# Patient Record
Sex: FEMALE | Race: WHITE | NOT HISPANIC OR LATINO
[De-identification: names, ages, dates, MRNs, and addresses within clinical notes are randomized per-mention and may not be internally consistent; named-entity substitution may affect disease eponyms.]

---

## 2017-01-13 ENCOUNTER — APPOINTMENT (OUTPATIENT)
Dept: CARDIOLOGY | Facility: CLINIC | Age: 32
End: 2017-01-13

## 2017-01-17 ENCOUNTER — APPOINTMENT (OUTPATIENT)
Dept: CARDIOLOGY | Facility: CLINIC | Age: 32
End: 2017-01-17

## 2019-03-07 ENCOUNTER — EMERGENCY (EMERGENCY)
Facility: HOSPITAL | Age: 34
LOS: 1 days | Discharge: HOME | End: 2019-03-07
Attending: EMERGENCY MEDICINE
Payer: COMMERCIAL

## 2019-03-07 VITALS
SYSTOLIC BLOOD PRESSURE: 101 MMHG | DIASTOLIC BLOOD PRESSURE: 56 MMHG | RESPIRATION RATE: 18 BRPM | OXYGEN SATURATION: 100 % | HEIGHT: 68 IN | HEART RATE: 74 BPM | TEMPERATURE: 99 F | WEIGHT: 179.9 LBS

## 2019-03-07 PROCEDURE — 93010 ELECTROCARDIOGRAM REPORT: CPT

## 2019-03-07 PROCEDURE — 99285 EMERGENCY DEPT VISIT HI MDM: CPT | Mod: 25

## 2019-03-07 RX ORDER — ASPIRIN/CALCIUM CARB/MAGNESIUM 324 MG
325 TABLET ORAL ONCE
Qty: 0 | Refills: 0 | Status: COMPLETED | OUTPATIENT
Start: 2019-03-07 | End: 2019-03-07

## 2019-03-07 NOTE — ED PROVIDER NOTE - CHPI ED SYMPTOMS POS
CHEST TIGHTNESS/BACK PAIN/paresthesias, arm pain BACK PAIN/CHEST TIGHTNESS/NAUSEA/paresthesias, arm pain CHEST TIGHTNESS/BACK PAIN/SHORTNESS OF BREATH/paresthesias, arm pain/NAUSEA

## 2019-03-07 NOTE — ED PROVIDER NOTE - PHYSICAL EXAMINATION
Const: Awake, alert and oriented. In no acute distress. Well appearing.  HEENT: NC/AT. Moist mucous membranes.  Eyes: No scleral icterus. EOMI.  Neck:. Soft and supple. Full ROM without pain.  Cardiac: +S1/S2. No murmurs.   Resp: Speaking in full sentences. No evidence of respiratory distress. Anterior chest wall tenderness on palpation, No wheezes, rales or rhonchi.  Abd: Soft, non-tender, non-distended. Normal bowel sounds in all 4 quadrants. No guarding or rebound.  MSK: Left shoulder tenderness on palpation, FROM in all extremities, neurovascularly intact, radial pulse 2+, hand  5/5   Back: Spine midline and non-tender. No CVAT.  Skin: No rashes, abrasions or lacerations.  Lymph: No cervical lymphadenopathy.  Neuro: Awake, alert & oriented x 3. CN II-XII intact, finger to nose intact, neurovasculary intact, muscle strength fair, gait without ataxia, reflexes intact

## 2019-03-07 NOTE — ED PROVIDER NOTE - CLINICAL SUMMARY MEDICAL DECISION MAKING FREE TEXT BOX
32 y/o F with PMHx of pleurisy (diagnosed at [facility] 2 years ago, while in the hospital admitted for PNA) presents to ED c/o L arm pain, described as stabbing pain with associated paresthesias and nausea onset this morning. Notes she has had diffuse chest pain that radiates to the back, exacerbated by deep breaths and laying flat with shortness of breath for the past month , will obtain chest x-ray, EKG, lab work, pain control, reassess

## 2019-03-07 NOTE — ED PROVIDER NOTE - ATTENDING CONTRIBUTION TO CARE
32 yo F presents to ED c/o midsternal CP and SOB x 1 month.  Pain radiated to L arm yesterday with no assoc N/V, diaphoresis or syncope.  Pt with prior hx of UE s/p placement of IV.  On exam pt awake and alert in NAD, mm moist, Neck supple, Cor Reg, Lungs clear b/l, Abd soft, NT, BS+, L shoulder and ant chest wall with palp tenderness.  EKG sinus with no acuter changes.  will check labs, CE, d-dimer CXR and re-eval

## 2019-03-07 NOTE — ED PROVIDER NOTE - OBJECTIVE STATEMENT
32 y/o F with PMHx of pleurisy (diagnosed at [facility] 2 years ago, while in the hospital admitted for PNA) presents to ED c/o L arm pain, described as stabbing pain with associated paresthesias onset this morning. States she went to work today, but the pain progressively got worse, and tonight began to develop diffuse chest pain that radiates to the back, exacerbated by deep breaths and laying flat. Took 800 mg of Motrin this morning with no relief. Family hx of CAD, stents and MI (paternal side), but no personal PMHx of cardiac problems. Has had a blood clot in the RUE after an angiogram where they went in through her wrist. Notes her current symptoms 34 y/o F with PMHx of pleurisy (diagnosed at [facility] 2 years ago, while in the hospital admitted for PNA) presents to ED c/o L arm pain, described as stabbing pain with associated paresthesias onset this morning. States she went to work today, but the pain progressively got worse, and tonight began to develop diffuse chest pain that radiates to the back, exacerbated by deep breaths and laying flat. Took 800 mg of Motrin this morning with no relief. Family hx of CAD, stents and MI (paternal side), but no personal PMHx of cardiac problems. Has had a blood clot in the RUE after an angiogram where they went in through her wrist. Notes her current symptoms feel similar to when she was diagnosed with the pleurisy. Additionally notes she was experiencing leg cramping earlier in the week which as since subsided. Denies fevers or chills, shortness of breath, abdominal pain, nausea, vomiting, calf pain or LE swelling. No further acute complaints at this time. 32 y/o F with PMHx of pleurisy (diagnosed at [facility] 2 years ago, while in the hospital admitted for PNA) presents to ED c/o L arm pain, described as stabbing pain with associated paresthesias onset this morning. States she went to work today, but the pain progressively got worse, and tonight began to develop nausea and diffuse chest pain that radiates to the back, exacerbated by deep breaths and laying flat. Took 800 mg of Motrin this morning with no relief. Family hx of CAD, stents and MI (paternal side), but no personal PMHx of cardiac problems. Has had a blood clot in the RUE after an angiogram where they went in through her wrist. Notes her current symptoms feel similar to when she was diagnosed with the pleurisy. Additionally notes she was experiencing leg cramping earlier in the week which as since subsided. Denies fevers or chills, shortness of breath, abdominal pain, vomiting, calf pain or LE swelling. No further acute complaints at this time. 32 y/o F with PMHx of pleurisy (diagnosed at [facility] 2 years ago, while in the hospital admitted for PNA) presents to ED c/o L arm pain, described as stabbing pain with associated paresthesias and nausea onset this morning. States she went to work today, but the pain progressively got worse. Notes she has had diffuse chest pain that radiates to the back, exacerbated by deep breaths and laying flat with shortness of breath for the past month but has never been evaluated by a doctor for it. Took 800 mg of Motrin this morning with no relief. Family hx of CAD, stents and MI (paternal side), but no personal PMHx of cardiac problems. Has had a blood clot in the RUE after an angiogram where they went in through her wrist. Notes her current symptoms feel similar to when she was diagnosed with the pleurisy. Additionally notes she was experiencing leg cramping earlier in the week which as since subsided. Denies fevers or chills, abdominal pain, vomiting, calf pain or LE swelling. No further acute complaints at this time.

## 2019-03-08 VITALS
TEMPERATURE: 98 F | OXYGEN SATURATION: 100 % | HEART RATE: 68 BPM | RESPIRATION RATE: 20 BRPM | DIASTOLIC BLOOD PRESSURE: 70 MMHG | SYSTOLIC BLOOD PRESSURE: 115 MMHG

## 2019-03-08 LAB
ALBUMIN SERPL ELPH-MCNC: 4.3 G/DL — SIGNIFICANT CHANGE UP (ref 3.3–5.2)
ALP SERPL-CCNC: 63 U/L — SIGNIFICANT CHANGE UP (ref 40–120)
ALT FLD-CCNC: 14 U/L — SIGNIFICANT CHANGE UP
ANION GAP SERPL CALC-SCNC: 11 MMOL/L — SIGNIFICANT CHANGE UP (ref 5–17)
AST SERPL-CCNC: 21 U/L — SIGNIFICANT CHANGE UP
BASOPHILS # BLD AUTO: 0 K/UL — SIGNIFICANT CHANGE UP (ref 0–0.2)
BASOPHILS NFR BLD AUTO: 0.2 % — SIGNIFICANT CHANGE UP (ref 0–2)
BILIRUB SERPL-MCNC: 0.4 MG/DL — SIGNIFICANT CHANGE UP (ref 0.4–2)
BUN SERPL-MCNC: 15 MG/DL — SIGNIFICANT CHANGE UP (ref 8–20)
CALCIUM SERPL-MCNC: 9.1 MG/DL — SIGNIFICANT CHANGE UP (ref 8.6–10.2)
CHLORIDE SERPL-SCNC: 102 MMOL/L — SIGNIFICANT CHANGE UP (ref 98–107)
CK SERPL-CCNC: 130 U/L — SIGNIFICANT CHANGE UP (ref 25–170)
CO2 SERPL-SCNC: 25 MMOL/L — SIGNIFICANT CHANGE UP (ref 22–29)
CREAT SERPL-MCNC: 0.76 MG/DL — SIGNIFICANT CHANGE UP (ref 0.5–1.3)
D DIMER BLD IA.RAPID-MCNC: <150 NG/ML DDU — SIGNIFICANT CHANGE UP
EOSINOPHIL # BLD AUTO: 0.2 K/UL — SIGNIFICANT CHANGE UP (ref 0–0.5)
EOSINOPHIL NFR BLD AUTO: 2.3 % — SIGNIFICANT CHANGE UP (ref 0–6)
GLUCOSE SERPL-MCNC: 85 MG/DL — SIGNIFICANT CHANGE UP (ref 70–115)
HCG SERPL-ACNC: <4 MIU/ML — SIGNIFICANT CHANGE UP
HCT VFR BLD CALC: 41.2 % — SIGNIFICANT CHANGE UP (ref 37–47)
HGB BLD-MCNC: 13.6 G/DL — SIGNIFICANT CHANGE UP (ref 12–16)
LYMPHOCYTES # BLD AUTO: 2.6 K/UL — SIGNIFICANT CHANGE UP (ref 1–4.8)
LYMPHOCYTES # BLD AUTO: 24.9 % — SIGNIFICANT CHANGE UP (ref 20–55)
MCHC RBC-ENTMCNC: 29.3 PG — SIGNIFICANT CHANGE UP (ref 27–31)
MCHC RBC-ENTMCNC: 33 G/DL — SIGNIFICANT CHANGE UP (ref 32–36)
MCV RBC AUTO: 88.8 FL — SIGNIFICANT CHANGE UP (ref 81–99)
MONOCYTES # BLD AUTO: 0.6 K/UL — SIGNIFICANT CHANGE UP (ref 0–0.8)
MONOCYTES NFR BLD AUTO: 5.5 % — SIGNIFICANT CHANGE UP (ref 3–10)
NEUTROPHILS # BLD AUTO: 7.1 K/UL — SIGNIFICANT CHANGE UP (ref 1.8–8)
NEUTROPHILS NFR BLD AUTO: 66.6 % — SIGNIFICANT CHANGE UP (ref 37–73)
PLATELET # BLD AUTO: 283 K/UL — SIGNIFICANT CHANGE UP (ref 150–400)
POTASSIUM SERPL-MCNC: 4 MMOL/L — SIGNIFICANT CHANGE UP (ref 3.5–5.3)
POTASSIUM SERPL-SCNC: 4 MMOL/L — SIGNIFICANT CHANGE UP (ref 3.5–5.3)
PROT SERPL-MCNC: 7.1 G/DL — SIGNIFICANT CHANGE UP (ref 6.6–8.7)
RBC # BLD: 4.64 M/UL — SIGNIFICANT CHANGE UP (ref 4.4–5.2)
RBC # FLD: 13 % — SIGNIFICANT CHANGE UP (ref 11–15.6)
SODIUM SERPL-SCNC: 138 MMOL/L — SIGNIFICANT CHANGE UP (ref 135–145)
TROPONIN T SERPL-MCNC: <0.01 NG/ML — SIGNIFICANT CHANGE UP (ref 0–0.06)
WBC # BLD: 10.6 K/UL — SIGNIFICANT CHANGE UP (ref 4.8–10.8)
WBC # FLD AUTO: 10.6 K/UL — SIGNIFICANT CHANGE UP (ref 4.8–10.8)

## 2019-03-08 PROCEDURE — 71045 X-RAY EXAM CHEST 1 VIEW: CPT

## 2019-03-08 PROCEDURE — 84484 ASSAY OF TROPONIN QUANT: CPT

## 2019-03-08 PROCEDURE — 80053 COMPREHEN METABOLIC PANEL: CPT

## 2019-03-08 PROCEDURE — 85379 FIBRIN DEGRADATION QUANT: CPT

## 2019-03-08 PROCEDURE — 93005 ELECTROCARDIOGRAM TRACING: CPT

## 2019-03-08 PROCEDURE — 99284 EMERGENCY DEPT VISIT MOD MDM: CPT | Mod: 25

## 2019-03-08 PROCEDURE — 36415 COLL VENOUS BLD VENIPUNCTURE: CPT

## 2019-03-08 PROCEDURE — 96374 THER/PROPH/DIAG INJ IV PUSH: CPT

## 2019-03-08 PROCEDURE — 82550 ASSAY OF CK (CPK): CPT

## 2019-03-08 PROCEDURE — 84702 CHORIONIC GONADOTROPIN TEST: CPT

## 2019-03-08 PROCEDURE — 85027 COMPLETE CBC AUTOMATED: CPT

## 2019-03-08 PROCEDURE — 71045 X-RAY EXAM CHEST 1 VIEW: CPT | Mod: 26

## 2019-03-08 RX ORDER — KETOROLAC TROMETHAMINE 30 MG/ML
30 SYRINGE (ML) INJECTION ONCE
Qty: 0 | Refills: 0 | Status: DISCONTINUED | OUTPATIENT
Start: 2019-03-08 | End: 2019-03-08

## 2019-03-08 RX ORDER — OXYCODONE AND ACETAMINOPHEN 5; 325 MG/1; MG/1
1 TABLET ORAL ONCE
Qty: 0 | Refills: 0 | Status: DISCONTINUED | OUTPATIENT
Start: 2019-03-08 | End: 2019-03-08

## 2019-03-08 RX ADMIN — Medication 30 MILLIGRAM(S): at 01:43

## 2019-03-08 RX ADMIN — Medication 325 MILLIGRAM(S): at 00:14

## 2019-03-08 RX ADMIN — OXYCODONE AND ACETAMINOPHEN 1 TABLET(S): 5; 325 TABLET ORAL at 02:27

## 2019-03-08 NOTE — ED ADULT NURSE NOTE - OBJECTIVE STATEMENT
pt is here with c./o "lung pain" that radiates to her L arm.  P/s the lung pain started one month ago and the arm pain started this morning. P/s she has a hx of pleurisy and feels the pain is the same.  Resp even/unlabored, NAD noted, CM shows NSR

## 2020-07-21 ENCOUNTER — APPOINTMENT (OUTPATIENT)
Dept: DISASTER EMERGENCY | Facility: CLINIC | Age: 35
End: 2020-07-21

## 2020-07-21 DIAGNOSIS — Z01.818 ENCOUNTER FOR OTHER PREPROCEDURAL EXAMINATION: ICD-10-CM

## 2020-07-21 PROBLEM — R09.1 PLEURISY: Chronic | Status: ACTIVE | Noted: 2019-03-19

## 2020-07-21 PROBLEM — J18.9 PNEUMONIA, UNSPECIFIED ORGANISM: Chronic | Status: ACTIVE | Noted: 2019-03-19

## 2020-07-22 LAB — SARS-COV-2 N GENE NPH QL NAA+PROBE: NOT DETECTED

## 2020-09-11 NOTE — ED ADULT TRIAGE NOTE - CADM TRG TX PRIOR TO ARRIVAL
Health Maintenance Due   Topic Date Due   • DTaP/Tdap/Td Vaccine (1 - Tdap) 06/08/1970   • Shingles Vaccine (2 of 3) 04/18/2012   • Pneumococcal Vaccine 65+ (2 of 2 - PPSV23) 02/22/2017   • Colorectal Cancer Screening-Colonoscopy  02/19/2018       Patient is due for topics as listed above but is not proceeding with Immunization(s) Dtap/Tdap/Td, Pneumococcal and Shingles at this time.            none

## 2021-05-24 ENCOUNTER — APPOINTMENT (OUTPATIENT)
Dept: DISASTER EMERGENCY | Facility: CLINIC | Age: 36
End: 2021-05-24

## 2021-05-25 LAB — SARS-COV-2 N GENE NPH QL NAA+PROBE: NOT DETECTED

## 2021-07-08 ENCOUNTER — TRANSCRIPTION ENCOUNTER (OUTPATIENT)
Age: 36
End: 2021-07-08

## 2021-10-05 ENCOUNTER — APPOINTMENT (OUTPATIENT)
Dept: DISASTER EMERGENCY | Facility: CLINIC | Age: 36
End: 2021-10-05

## 2021-10-06 LAB — SARS-COV-2 N GENE NPH QL NAA+PROBE: NOT DETECTED

## 2021-11-27 ENCOUNTER — TRANSCRIPTION ENCOUNTER (OUTPATIENT)
Age: 36
End: 2021-11-27

## 2022-03-15 ENCOUNTER — TRANSCRIPTION ENCOUNTER (OUTPATIENT)
Age: 37
End: 2022-03-15

## 2023-05-01 ENCOUNTER — NON-APPOINTMENT (OUTPATIENT)
Age: 38
End: 2023-05-01

## 2024-04-22 ENCOUNTER — EMERGENCY (EMERGENCY)
Facility: HOSPITAL | Age: 39
LOS: 1 days | Discharge: HOME | End: 2024-04-22
Attending: STUDENT IN AN ORGANIZED HEALTH CARE EDUCATION/TRAINING PROGRAM
Payer: COMMERCIAL

## 2024-04-22 VITALS
SYSTOLIC BLOOD PRESSURE: 111 MMHG | OXYGEN SATURATION: 100 % | DIASTOLIC BLOOD PRESSURE: 75 MMHG | WEIGHT: 169.98 LBS | TEMPERATURE: 97 F | RESPIRATION RATE: 17 BRPM | HEART RATE: 78 BPM

## 2024-04-22 VITALS
SYSTOLIC BLOOD PRESSURE: 97 MMHG | RESPIRATION RATE: 18 BRPM | HEART RATE: 80 BPM | DIASTOLIC BLOOD PRESSURE: 61 MMHG | OXYGEN SATURATION: 98 % | TEMPERATURE: 98 F

## 2024-04-22 LAB
ALBUMIN SERPL ELPH-MCNC: 4.3 G/DL — SIGNIFICANT CHANGE UP (ref 3.3–5.2)
ALP SERPL-CCNC: 66 U/L — SIGNIFICANT CHANGE UP (ref 40–120)
ALT FLD-CCNC: 11 U/L — SIGNIFICANT CHANGE UP
ANION GAP SERPL CALC-SCNC: 9 MMOL/L — SIGNIFICANT CHANGE UP (ref 5–17)
AST SERPL-CCNC: 14 U/L — SIGNIFICANT CHANGE UP
BASOPHILS # BLD AUTO: 0.03 K/UL — SIGNIFICANT CHANGE UP (ref 0–0.2)
BASOPHILS NFR BLD AUTO: 0.3 % — SIGNIFICANT CHANGE UP (ref 0–2)
BILIRUB SERPL-MCNC: 0.4 MG/DL — SIGNIFICANT CHANGE UP (ref 0.4–2)
BUN SERPL-MCNC: 9.6 MG/DL — SIGNIFICANT CHANGE UP (ref 8–20)
CALCIUM SERPL-MCNC: 9.3 MG/DL — SIGNIFICANT CHANGE UP (ref 8.4–10.5)
CHLORIDE SERPL-SCNC: 105 MMOL/L — SIGNIFICANT CHANGE UP (ref 96–108)
CO2 SERPL-SCNC: 28 MMOL/L — SIGNIFICANT CHANGE UP (ref 22–29)
CREAT SERPL-MCNC: 0.67 MG/DL — SIGNIFICANT CHANGE UP (ref 0.5–1.3)
EGFR: 113 ML/MIN/1.73M2 — SIGNIFICANT CHANGE UP
EOSINOPHIL # BLD AUTO: 0.21 K/UL — SIGNIFICANT CHANGE UP (ref 0–0.5)
EOSINOPHIL NFR BLD AUTO: 1.9 % — SIGNIFICANT CHANGE UP (ref 0–6)
GLUCOSE SERPL-MCNC: 68 MG/DL — LOW (ref 70–99)
HCG SERPL-ACNC: <4 MIU/ML — SIGNIFICANT CHANGE UP
HCT VFR BLD CALC: 42 % — SIGNIFICANT CHANGE UP (ref 34.5–45)
HGB BLD-MCNC: 14.3 G/DL — SIGNIFICANT CHANGE UP (ref 11.5–15.5)
IMM GRANULOCYTES NFR BLD AUTO: 0.6 % — SIGNIFICANT CHANGE UP (ref 0–0.9)
LYMPHOCYTES # BLD AUTO: 1.57 K/UL — SIGNIFICANT CHANGE UP (ref 1–3.3)
LYMPHOCYTES # BLD AUTO: 14.1 % — SIGNIFICANT CHANGE UP (ref 13–44)
MCHC RBC-ENTMCNC: 31.2 PG — SIGNIFICANT CHANGE UP (ref 27–34)
MCHC RBC-ENTMCNC: 34 GM/DL — SIGNIFICANT CHANGE UP (ref 32–36)
MCV RBC AUTO: 91.7 FL — SIGNIFICANT CHANGE UP (ref 80–100)
MONOCYTES # BLD AUTO: 0.73 K/UL — SIGNIFICANT CHANGE UP (ref 0–0.9)
MONOCYTES NFR BLD AUTO: 6.6 % — SIGNIFICANT CHANGE UP (ref 2–14)
NEUTROPHILS # BLD AUTO: 8.53 K/UL — HIGH (ref 1.8–7.4)
NEUTROPHILS NFR BLD AUTO: 76.5 % — SIGNIFICANT CHANGE UP (ref 43–77)
NT-PROBNP SERPL-SCNC: 58 PG/ML — SIGNIFICANT CHANGE UP (ref 0–300)
PLATELET # BLD AUTO: 303 K/UL — SIGNIFICANT CHANGE UP (ref 150–400)
POTASSIUM SERPL-MCNC: 4.1 MMOL/L — SIGNIFICANT CHANGE UP (ref 3.5–5.3)
POTASSIUM SERPL-SCNC: 4.1 MMOL/L — SIGNIFICANT CHANGE UP (ref 3.5–5.3)
PROT SERPL-MCNC: 7 G/DL — SIGNIFICANT CHANGE UP (ref 6.6–8.7)
RBC # BLD: 4.58 M/UL — SIGNIFICANT CHANGE UP (ref 3.8–5.2)
RBC # FLD: 13.3 % — SIGNIFICANT CHANGE UP (ref 10.3–14.5)
SODIUM SERPL-SCNC: 142 MMOL/L — SIGNIFICANT CHANGE UP (ref 135–145)
TROPONIN T, HIGH SENSITIVITY RESULT: <6 NG/L — SIGNIFICANT CHANGE UP (ref 0–51)
WBC # BLD: 11.14 K/UL — HIGH (ref 3.8–10.5)
WBC # FLD AUTO: 11.14 K/UL — HIGH (ref 3.8–10.5)

## 2024-04-22 PROCEDURE — 71275 CT ANGIOGRAPHY CHEST: CPT | Mod: 26,MC

## 2024-04-22 PROCEDURE — 36415 COLL VENOUS BLD VENIPUNCTURE: CPT

## 2024-04-22 PROCEDURE — 99285 EMERGENCY DEPT VISIT HI MDM: CPT

## 2024-04-22 PROCEDURE — 83880 ASSAY OF NATRIURETIC PEPTIDE: CPT

## 2024-04-22 PROCEDURE — 96374 THER/PROPH/DIAG INJ IV PUSH: CPT | Mod: XU

## 2024-04-22 PROCEDURE — 84484 ASSAY OF TROPONIN QUANT: CPT

## 2024-04-22 PROCEDURE — 71045 X-RAY EXAM CHEST 1 VIEW: CPT | Mod: 26

## 2024-04-22 PROCEDURE — 71045 X-RAY EXAM CHEST 1 VIEW: CPT

## 2024-04-22 PROCEDURE — 80053 COMPREHEN METABOLIC PANEL: CPT

## 2024-04-22 PROCEDURE — 84702 CHORIONIC GONADOTROPIN TEST: CPT

## 2024-04-22 PROCEDURE — 85025 COMPLETE CBC W/AUTO DIFF WBC: CPT

## 2024-04-22 PROCEDURE — 93005 ELECTROCARDIOGRAM TRACING: CPT

## 2024-04-22 PROCEDURE — 99285 EMERGENCY DEPT VISIT HI MDM: CPT | Mod: 25

## 2024-04-22 PROCEDURE — 71275 CT ANGIOGRAPHY CHEST: CPT | Mod: MC

## 2024-04-22 PROCEDURE — 93010 ELECTROCARDIOGRAM REPORT: CPT

## 2024-04-22 RX ORDER — IBUPROFEN 200 MG
600 TABLET ORAL ONCE
Refills: 0 | Status: COMPLETED | OUTPATIENT
Start: 2024-04-22 | End: 2024-04-22

## 2024-04-22 RX ORDER — MORPHINE SULFATE 50 MG/1
4 CAPSULE, EXTENDED RELEASE ORAL ONCE
Refills: 0 | Status: DISCONTINUED | OUTPATIENT
Start: 2024-04-22 | End: 2024-04-22

## 2024-04-22 RX ORDER — METHOCARBAMOL 500 MG/1
1500 TABLET, FILM COATED ORAL ONCE
Refills: 0 | Status: COMPLETED | OUTPATIENT
Start: 2024-04-22 | End: 2024-04-22

## 2024-04-22 RX ADMIN — METHOCARBAMOL 1500 MILLIGRAM(S): 500 TABLET, FILM COATED ORAL at 15:00

## 2024-04-22 RX ADMIN — Medication 600 MILLIGRAM(S): at 17:10

## 2024-04-22 RX ADMIN — MORPHINE SULFATE 4 MILLIGRAM(S): 50 CAPSULE, EXTENDED RELEASE ORAL at 17:12

## 2024-04-22 NOTE — ED PROVIDER NOTE - NSFOLLOWUPINSTRUCTIONS_ED_ALL_ED_FT
1. Return to ED for worsening, progressive or any other concerning symptoms   2. Follow up with your primary care doctor in 2-3days  3. Follow up with cardiologist within 1-3 days. Call the number below to confirm your appointment.  4. May take tylenol and ibuprofen for pain. Take tylenol every 6 hours, do not exceed 4000 mg in 24 hours. Take ibuprofen every 6 hours, do not exceed 3200 mg in 24 hours. May alternate between tylenol and ibuprofen every 3 hours.    Chest Pain    Chest pain can be caused by many different conditions which may or may not be dangerous. Causes include heartburn, lung infections, heart attack, blood clot in lungs, skin infections, strain or damage to muscle, cartilage, or bones, etc. In addition to a history and physical examination, an electrocardiogram (ECG) or other lab tests may have been performed to determine the cause of your chest pain. Follow up with your primary care provider or with a cardiologist as instructed.     SEEK IMMEDIATE MEDICAL CARE IF YOU HAVE ANY OF THE FOLLOWING SYMPTOMS: worsening chest pain, coughing up blood, unexplained back/neck/jaw pain, severe abdominal pain, dizziness or lightheadedness, fainting, shortness of breath, sweaty or clammy skin, vomiting, or racing heart beat. These symptoms may represent a serious problem that is an emergency. Do not wait to see if the symptoms will go away. Get medical help right away. Call 911 and do not drive yourself to the hospital.

## 2024-04-22 NOTE — ED PROVIDER NOTE - ATTENDING CONTRIBUTION TO CARE
I have seen and examined this patient and fully participated in the care of this patient as the teaching attending. I personally made/approved the management plan and take responsibility for the patient management.     I have reviewed the resident's documentation. The documentation has been edited as indicated to reflect my findings. Jeanette Feng MD, Attending Physician

## 2024-04-22 NOTE — ED ADULT NURSE NOTE - CHIEF COMPLAINT QUOTE
sent by  for +PNA and r/o P.E. ; c/o chest pain going across entire chest "pressure like someone is sitting on it" onset yesterday, + SOB; pt also reports hx P.E. + orthopnea

## 2024-04-22 NOTE — ED PROVIDER NOTE - CLINICAL SUMMARY MEDICAL DECISION MAKING FREE TEXT BOX
37 yo F PMH RUE DVT (5 years ago, no longer on lovenox), hx pleurisy presents for chest pain x 2 days. +SOB +dyspnea on exertion. Describes a squeezing chest pressure which is progressively worsening, radiates from L chest to L shoulder and up to L jaw.  Denies diaphoresis, nausea/vomiting, LE swelling. Denies fevers, sweats, chills. No personal cardiac hx, +paternal hx MI age 40s, +maternal hx stent placement age 60s. Pt has Mirena (hormone-secreting) IUD, +vaping daily. In ED, afebrile VSS. On exam, +reproducible midsternal and L chest wall tenderness +diminished L-sided breath sounds, no respiratory distress, satting 100% on RA. No LE edema.     r/o PE vs PNA vs r/o ACS  Will obtain labs, troponin, hCG, BNP, EKG, CXR, CT chest PE protocol. Pain control. Pulse ox and cardiac monitor.     If workup negative, will consider cardiology consult and observation for further cardiology evaluation vs outpatient cardiology followup and discharge home. 37 yo F PMH RUE DVT (5 years ago, no longer on lovenox), hx pleurisy presents for chest pain x 2 days. +SOB +dyspnea on exertion. Describes a squeezing chest pressure which is progressively worsening, radiates from L chest to L shoulder and up to L jaw.  Denies diaphoresis, nausea/vomiting, LE swelling. Denies fevers, sweats, chills. No personal cardiac hx, +paternal hx MI age 40s, +maternal hx stent placement age 60s. Pt has Mirena (hormone-secreting) IUD, +vaping daily. In ED, afebrile VSS. On exam, +reproducible midsternal and L chest wall tenderness +diminished L-sided breath sounds, no respiratory distress, satting 100% on RA. No LE edema.     r/o PE vs PNA vs r/o ACS  Will obtain labs, troponin, hCG, BNP, EKG, CXR, CT chest PE protocol. Pain control. Pulse ox and cardiac monitor.     If workup negative, will consider cardiology consult and observation for further cardiology evaluation vs outpatient cardiology followup and discharge home.    Jeanette Feng MD Attending Physician- HEATR score 2 (low risk). CTA negative for PE, PTX, PNA. No clinical suspicion for aortic dissection or esophgeal rupture at this time. patient has had pleurisy in the past and states this feels similar. given low heart score, shared decision making with patient regarding obs stay for cards eval vs discharge home with rapid cardiology follow up. patient chose to be discharged home. strict return precautions discussed.

## 2024-04-22 NOTE — ED ADULT TRIAGE NOTE - CHIEF COMPLAINT QUOTE
sent by  for PNA; c/o chest pain going across entire chest "pressure like someone is sitting on it" onset yesterday, + SOB; pt also reports hx P.E. + orthopnea sent by  for +PNA and r/o P.E. ; c/o chest pain going across entire chest "pressure like someone is sitting on it" onset yesterday, + SOB; pt also reports hx P.E. + orthopnea

## 2024-04-22 NOTE — ED PROVIDER NOTE - PROGRESS NOTE DETAILS
Discussed results with patient- EKG and troponin negative. CT chest negative for PE or PNA. Given nature of chest pain, pt given option of staying for further cardiac evaluation under observation vs discharge home with immediate outpatient cardiology follow up. Pt opting to go home and will follow up promptly with cardiology.

## 2024-04-22 NOTE — ED PROVIDER NOTE - OBJECTIVE STATEMENT
37 yo F PMH RUE DVT (5 years ago, no longer on lovenox), hx pleurisy presents for chest pain x 2 days. +SOB +dyspnea on exertion. Describes a squeezing chest pressure which is progressively worsening, radiates from L chest to L shoulder and up to L jaw. Went to urgent care today, Flu and COVID negative. CXR +PNA. Pt sent to ED for r/o PE. Denies diaphoresis, nausea/vomiting, LE swelling. Denies fevers, sweats, chills. No sick contacts. No personal cardiac hx, +paternal hx MI age 40s, +maternal hx stent placement age 60s. Pt has Mirena (hormone-secreting) IUD, +vaping daily. No abdominal pain, dysuria, hematuria, bloody stools.

## 2024-04-22 NOTE — ED PROVIDER NOTE - CARE PROVIDER_API CALL
Andra Lopez  Cardiology  [address], [address]  [place] [address]  Phone: [phone]  Fax: [phone]  Follow Up Time: Urgent

## 2024-04-22 NOTE — ED PROVIDER NOTE - PHYSICAL EXAMINATION
Gen: NAD, AOx3  Head: NCAT  HEENT: EOMI, oral mucosa moist, normal conjunctiva, neck supple  Chest: +reproducible midsternal and L chest wall tenderness  Lung: +diminished L-sided breath sounds, no respiratory distress, satting 100% on RA  CV: rrr, no murmur, Normal perfusion  Abd: soft, NT, ND  MSK: No edema, no visible deformities  Neuro: No focal neurologic deficits  Skin: No rash   Psych: normal affect Gen: NAD, AOx3  Head: NCAT  HEENT: EOMI, oral mucosa moist, normal conjunctiva, neck supple  Chest: +reproducible midsternal and L chest wall tenderness  Lung: +diminished L-sided breath sounds, no respiratory distress, satting 100% on RA  CV: rrr, no murmur, Normal perfusion  Abd: soft, NT, ND  MSK: No edema, no visible deformities  Neuro: No focal neurologic deficits  Skin: No rash   Psych: normal affect    Jeanette Feng MD Attending Physician  PHYSICAL EXAM:   General: appears uncomfortable but nontoxic  HEENT: NC/AT,  airway patent  Cardiovascular: regular rate and rhythm, + S1/S2, no murmurs, rubs, gallops appreciated  Respiratory: clear to auscultation bilaterally, good aeration bilaterally, nonlabored respirations  Abdominal: soft, nontender, nondistended, no rebound, guarding or rigidity  Extremities: no LE edema or calf tenderness or asymmetric discoloration b/l. Radial pulses equal and strong b/l  Neuro: Alert and oriented x3. Moving all extremities.   Psychiatric: appropriate mood and affect.   -Jeanette Feng MD Attending Physician

## 2024-04-22 NOTE — ED PROVIDER NOTE - NSICDXPASTMEDICALHX_GEN_ALL_CORE_FT
PAST MEDICAL HISTORY:  Arm DVT (deep venous thromboembolism), acute, right 5 years ago, no longer on lovenox    Pleurisy     PNA (pneumonia)

## 2024-04-22 NOTE — ED ADULT NURSE NOTE - NS ED NURSE LEVEL OF CONSCIOUSNESS ORIENTATION
Please inform Madeline Larios that forms are ready for   I have placed in my outbasket   Oriented - self; Oriented - place; Oriented - time

## 2024-04-22 NOTE — ED PROVIDER NOTE - PATIENT PORTAL LINK FT
You can access the FollowMyHealth Patient Portal offered by [facility] by registering at the following website: [email or web address]. By joining [employer]’s FollowMyHealth portal, you will also be able to view your health information using other applications (apps) compatible with our system.

## 2024-04-22 NOTE — ED PROVIDER NOTE - NSPTACCESSSVCSAPPTDETAILS_ED_ALL_ED_FT
Immediate cardiology follow up - For crushing chest pain, family hx of cardiac events - paternal age 40s, maternal age 60s. Pt has never seen cardiologist.

## 2024-04-22 NOTE — ED ADULT NURSE NOTE - HOW OFTEN DO YOU HAVE A DRINK CONTAINING ALCOHOL?
Our patient no showed her MASHA appointment today.  Voicemail was left asking patient to call the office for a reschedule (8:19 am 11/21/22).   Never

## 2024-04-22 NOTE — ED ADULT NURSE NOTE - OBJECTIVE STATEMENT
Pt a&ox4 complains of chest tightness and discomfort especially while breathing in. Pt has been experiencing cold symptoms for past two days, went to urgent care this and was referred to Emergency Department for CT. Pt has  and cardiac monitoring in place, VSS, respirations even and unlabored on room air, no distress noted. Pt labs sent.

## 2024-04-23 ENCOUNTER — RESULT REVIEW (OUTPATIENT)
Age: 39
End: 2024-04-23

## 2024-04-23 ENCOUNTER — EMERGENCY (EMERGENCY)
Facility: HOSPITAL | Age: 39
LOS: 1 days | Discharge: HOME | End: 2024-04-23
Attending: EMERGENCY MEDICINE
Payer: COMMERCIAL

## 2024-04-23 VITALS
SYSTOLIC BLOOD PRESSURE: 118 MMHG | WEIGHT: 185.19 LBS | HEIGHT: 65 IN | HEART RATE: 101 BPM | DIASTOLIC BLOOD PRESSURE: 70 MMHG | RESPIRATION RATE: 18 BRPM | OXYGEN SATURATION: 98 % | TEMPERATURE: 98 F

## 2024-04-23 VITALS
OXYGEN SATURATION: 94 % | SYSTOLIC BLOOD PRESSURE: 108 MMHG | HEART RATE: 95 BPM | RESPIRATION RATE: 18 BRPM | DIASTOLIC BLOOD PRESSURE: 69 MMHG | TEMPERATURE: 98 F

## 2024-04-23 DIAGNOSIS — R07.89 OTHER CHEST PAIN: ICD-10-CM

## 2024-04-23 LAB
A1C WITH ESTIMATED AVERAGE GLUCOSE RESULT: 4.9 % — SIGNIFICANT CHANGE UP (ref 4–5.6)
CHOLEST SERPL-MCNC: 159 MG/DL — SIGNIFICANT CHANGE UP
CRP SERPL-MCNC: 89 MG/L — HIGH
ERYTHROCYTE [SEDIMENTATION RATE] IN BLOOD: 26 MM/HR — HIGH (ref 0–20)
ESTIMATED AVERAGE GLUCOSE: 94 MG/DL — SIGNIFICANT CHANGE UP (ref 68–114)
HDLC SERPL-MCNC: 45 MG/DL — LOW
LIPID PNL WITH DIRECT LDL SERPL: 93 MG/DL — SIGNIFICANT CHANGE UP
NON HDL CHOLESTEROL: 114 MG/DL — SIGNIFICANT CHANGE UP
TRIGL SERPL-MCNC: 103 MG/DL — SIGNIFICANT CHANGE UP
TROPONIN T, HIGH SENSITIVITY RESULT: <6 NG/L — SIGNIFICANT CHANGE UP (ref 0–51)

## 2024-04-23 PROCEDURE — 96376 TX/PRO/DX INJ SAME DRUG ADON: CPT | Mod: XU

## 2024-04-23 PROCEDURE — G0378: CPT

## 2024-04-23 PROCEDURE — 83036 HEMOGLOBIN GLYCOSYLATED A1C: CPT

## 2024-04-23 PROCEDURE — 96375 TX/PRO/DX INJ NEW DRUG ADDON: CPT | Mod: XU

## 2024-04-23 PROCEDURE — C8929: CPT

## 2024-04-23 PROCEDURE — 96374 THER/PROPH/DIAG INJ IV PUSH: CPT | Mod: XU

## 2024-04-23 PROCEDURE — 99223 1ST HOSP IP/OBS HIGH 75: CPT

## 2024-04-23 PROCEDURE — 36415 COLL VENOUS BLD VENIPUNCTURE: CPT

## 2024-04-23 PROCEDURE — 93005 ELECTROCARDIOGRAM TRACING: CPT

## 2024-04-23 PROCEDURE — 99284 EMERGENCY DEPT VISIT MOD MDM: CPT | Mod: 25

## 2024-04-23 PROCEDURE — 84484 ASSAY OF TROPONIN QUANT: CPT

## 2024-04-23 PROCEDURE — 86140 C-REACTIVE PROTEIN: CPT

## 2024-04-23 PROCEDURE — 99254 IP/OBS CNSLTJ NEW/EST MOD 60: CPT

## 2024-04-23 PROCEDURE — 93010 ELECTROCARDIOGRAM REPORT: CPT

## 2024-04-23 PROCEDURE — 85652 RBC SED RATE AUTOMATED: CPT

## 2024-04-23 PROCEDURE — 93306 TTE W/DOPPLER COMPLETE: CPT | Mod: 26

## 2024-04-23 PROCEDURE — 80061 LIPID PANEL: CPT

## 2024-04-23 RX ORDER — SERTRALINE 25 MG/1
1 TABLET, FILM COATED ORAL
Refills: 0 | DISCHARGE

## 2024-04-23 RX ORDER — KETOROLAC TROMETHAMINE 30 MG/ML
15 SYRINGE (ML) INJECTION EVERY 6 HOURS
Refills: 0 | Status: COMPLETED | OUTPATIENT
Start: 2024-04-23 | End: 2024-04-28

## 2024-04-23 RX ORDER — LIDOCAINE 4 G/100G
5 CREAM TOPICAL ONCE
Refills: 0 | Status: COMPLETED | OUTPATIENT
Start: 2024-04-23 | End: 2024-04-23

## 2024-04-23 RX ORDER — MORPHINE SULFATE 50 MG/1
4 CAPSULE, EXTENDED RELEASE ORAL ONCE
Refills: 0 | Status: DISCONTINUED | OUTPATIENT
Start: 2024-04-23 | End: 2024-04-23

## 2024-04-23 RX ORDER — OXYCODONE HYDROCHLORIDE 5 MG/1
1 TABLET ORAL
Qty: 12 | Refills: 0
Start: 2024-04-23 | End: 2024-04-25

## 2024-04-23 RX ORDER — COLCHICINE 0.6 MG
0.6 TABLET ORAL ONCE
Refills: 0 | Status: COMPLETED | OUTPATIENT
Start: 2024-04-23 | End: 2024-04-23

## 2024-04-23 RX ORDER — LAMOTRIGINE 25 MG/1
1 TABLET, ORALLY DISINTEGRATING ORAL
Refills: 0 | DISCHARGE

## 2024-04-23 RX ORDER — SERTRALINE 25 MG/1
100 TABLET, FILM COATED ORAL DAILY
Refills: 0 | Status: DISCONTINUED | OUTPATIENT
Start: 2024-04-23 | End: 2024-04-30

## 2024-04-23 RX ORDER — COLCHICINE 0.6 MG
1 TABLET ORAL
Qty: 60 | Refills: 0
Start: 2024-04-23 | End: 2024-05-22

## 2024-04-23 RX ORDER — FAMOTIDINE 10 MG/ML
20 INJECTION INTRAVENOUS ONCE
Refills: 0 | Status: COMPLETED | OUTPATIENT
Start: 2024-04-23 | End: 2024-04-23

## 2024-04-23 RX ORDER — IBUPROFEN 200 MG
1 TABLET ORAL
Qty: 120 | Refills: 0
Start: 2024-04-23 | End: 2025-02-20

## 2024-04-23 RX ORDER — COLCHICINE 0.6 MG/1
1 CAPSULE ORAL
Qty: 60 | Refills: 0
Start: 2024-04-23 | End: 2025-02-20

## 2024-04-23 RX ORDER — PANTOPRAZOLE SODIUM 20 MG/1
1 TABLET, DELAYED RELEASE ORAL
Qty: 30 | Refills: 0
Start: 2024-04-23 | End: 2024-05-22

## 2024-04-23 RX ORDER — OXYCODONE HYDROCHLORIDE 5 MG/1
5 TABLET ORAL ONCE
Refills: 0 | Status: DISCONTINUED | OUTPATIENT
Start: 2024-04-23 | End: 2024-04-23

## 2024-04-23 RX ORDER — COLCHICINE 0.6 MG
1.2 TABLET ORAL ONCE
Refills: 0 | Status: DISCONTINUED | OUTPATIENT
Start: 2024-04-23 | End: 2024-04-23

## 2024-04-23 RX ORDER — PANTOPRAZOLE SODIUM 20 MG/1
40 TABLET, DELAYED RELEASE ORAL DAILY
Refills: 0 | Status: DISCONTINUED | OUTPATIENT
Start: 2024-04-23 | End: 2024-04-30

## 2024-04-23 RX ORDER — LAMOTRIGINE 25 MG/1
100 TABLET, ORALLY DISINTEGRATING ORAL
Refills: 0 | Status: DISCONTINUED | OUTPATIENT
Start: 2024-04-23 | End: 2024-04-30

## 2024-04-23 RX ORDER — COLCHICINE 0.6 MG
1.2 TABLET ORAL
Refills: 0 | Status: DISCONTINUED | OUTPATIENT
Start: 2024-04-23 | End: 2024-04-23

## 2024-04-23 RX ORDER — IBUPROFEN 200 MG
1 TABLET ORAL
Qty: 120 | Refills: 0
Start: 2024-04-23 | End: 2024-05-22

## 2024-04-23 RX ADMIN — SERTRALINE 100 MILLIGRAM(S): 25 TABLET, FILM COATED ORAL at 10:11

## 2024-04-23 RX ADMIN — LAMOTRIGINE 100 MILLIGRAM(S): 25 TABLET, ORALLY DISINTEGRATING ORAL at 17:05

## 2024-04-23 RX ADMIN — MORPHINE SULFATE 4 MILLIGRAM(S): 50 CAPSULE, EXTENDED RELEASE ORAL at 05:40

## 2024-04-23 RX ADMIN — Medication 15 MILLIGRAM(S): at 10:10

## 2024-04-23 RX ADMIN — LIDOCAINE 5 MILLILITER(S): 4 CREAM TOPICAL at 04:57

## 2024-04-23 RX ADMIN — Medication 0.6 MILLIGRAM(S): at 14:24

## 2024-04-23 RX ADMIN — Medication 600 MILLIGRAM(S): at 15:56

## 2024-04-23 RX ADMIN — OXYCODONE HYDROCHLORIDE 5 MILLIGRAM(S): 5 TABLET ORAL at 17:45

## 2024-04-23 RX ADMIN — PANTOPRAZOLE SODIUM 40 MILLIGRAM(S): 20 TABLET, DELAYED RELEASE ORAL at 15:56

## 2024-04-23 RX ADMIN — Medication 30 MILLILITER(S): at 04:57

## 2024-04-23 RX ADMIN — Medication 15 MILLIGRAM(S): at 17:05

## 2024-04-23 RX ADMIN — MORPHINE SULFATE 4 MILLIGRAM(S): 50 CAPSULE, EXTENDED RELEASE ORAL at 11:11

## 2024-04-23 NOTE — ED CDU PROVIDER INITIAL DAY NOTE - PHYSICAL EXAMINATION
Gen: No acute distress, non toxic. Sitting upright  Head: NCAT  Eyes: pink conjunctivae, EOMI, PERRL  CV: RRR, nl s1/s2.  Resp: CTAB, normal rate and effort  GI: Abdomen soft, NT, ND. No rebound, no guarding  : No CVAT  Neuro: A&O x 3, sensorimotor intact without deficits   MSK: Full ROM ext x 4  Skin: No rashes. intact and perfused.

## 2024-04-23 NOTE — ED CDU PROVIDER INITIAL DAY NOTE - ATTENDING SHARED VISIT SELECTOR YES
[place] INPATIENT ENCOUNTER   DAILY PROGRESS NOTE    ADMISSION DATE:  10/13/2021  DATE:  10/17/2021  CURRENT HOSPITAL DAY:  Hospital Day: 5  ATTENDING PHYSICIAN:  Jamal Thayer DO  CODE STATUS:  Full Resuscitation  Addendum:  Patient was seen and examined independently of NP.  I agree with the assessment and plan below except for any indicated changes.    At time of exam, patient was oriented to person, place, time.   Heart regular rate and rhythm, no murmurs  Lungs clear to auscultation  Abdomen soft, non-tender, non-distended     Patient with acute GI bleed secondary to visible vessel in the fundus s/p clip. Hgb stable at 8.5 (8.7, 9.7). No further bleeding. Hx of alcohol abuse but no varices noted on EGD. Continue PPI BID. Diet as tolerated. Will monitor Hgb and transfuse as necessary.     Thank you for allowing me to participate in the care of your patient. Please call with any questions/concerns.   Ruth Masterscésar        CHIEF COMPLAINT:  Chest discomfort     CONSULT DIAGNOSIS: GI bleed    Interval History: Feeling better today. still some intermittent chest discomfort. Having brown stools & tolerating regular diet - not much appetite though. On upper endoscopy found to have severe ulcerative esophagitis and visible vessel in fundus s/p clip.     Assessment:  44 year old female with a history as listed below presents with the following:    Upper GI bleed  -- 2/2 severe ulcerative esophagitis & visible vessel in fundus  -- Blood in NG aspirate  -- No further bleeding overnight    Chest pain, improved  -- 2/2 above    Acute blood loss anemia, improved  -- Hgb 8.5 (8.9)    Elevated liver tests  -- 2/2 alcohol abuse.   --  (353),  (233),  (87), Tbili 1.4 (1.9)  -- Diffuse hepatic steatosis on Liver US 10/14. No appreciable cirrhosis.    Plan:  -- Monitor for overt bleeding  -- Monitor H&H. Transfuse as needed to keep Hgb > 7.0  -- Protonix 40 mg BID.   -- Discussed need for alcohol cessation with  patient.  -- Await surgical pathology  -- Will recheck INR, monitor liver chemistries  -- Follows with outpatient Transplant Hepatology  -- Regular diet    If you have any questions or concerns, page me at [phone].  If unable to reach me by pager, call my office phone at [phone]  After 5 pm, page Dr. Kali BOWLES   Advanced Practice Clinician with  Dr. Ruth Bass  Department of Gastroenterology   [facility] [place]  Discussed with Dr. Bass    Past Endoscopic History:   EGD 10/14/2021: Severe ulcerative esophagitis  Small red protuberance in the fundus, status post clip   EGD/Colonoscopy 7/28/2021: NO PUD . Limted Colon Exam due to significant diverticulosis making sigmoid lumen tortuous and stenosed  Pathologic Diagnosis   A.   Duodenum, biopsy:  -No significant histopathology     B.   Gastric biopsy:  -No significant histopathology     C.   Esophageal biopsy:  -No significant histopathology     D.   Transverse colon polyp, biopsy:  -Tubular adenoma     EGD/Colonoscopy 2/7/2019: NO PUD; MULTIPLE COLON POLYPS - 6; R/O MILD COLITIS - S/P RANDOM BX    A: Duodenum, biopsy:     - Without diagnostic change; negative for villous abnormality.         B: Gastric biopsy:     - Chronic gastritis with reactive changes.     - Immunostain for Helicobacter pylori is negative.         C: Terminal ileum and random colon, biopsy:     - Without diagnostic change; negative for villous abnormality and microscopic     colitis.    Past Medical History:   Diagnosis Date    Allergic rhinitis 9/24/2015    Anxiety     Colon polyps 02/07/2019    1 yr recall, tubular adenoma & tubulocillous polyps/ Tori    Depression 9/24/2015    Fracture of left ankle     as child    Gastroesophageal reflux disease 9/24/2015    Hx of colonoscopy 02/07/2019     1 year   due to your history of colon polyps.    Dr Valle     Hyperlipidemia 9/24/2015    IUD (intrauterine device) in place 9/9/2015    Mirena IUD placed 4/2/12      Migraine     Obesity (BMI 30.0-34.9) 9/24/2015    Onychomycosis     Vitamin D deficiency 9/24/2015       Past Surgical History:   Procedure Laterality Date    Colonoscopy diagnostic  07/28/2021    adenoma x1, incomplete exam due to sigmoid narrowing, repeat in 1 year    Colonoscopy w biopsy  02/07/2019    Tubular adenoma and Tubulovillous adenoma polyps, Diverticulosis - next exam due in 1 year - Dr Valle    Esophagogastroduodenoscopy transoral flex diag  07/28/2021    Normal EGD with negative esophageal, gastric, and duodenal bx    Esophagogastroduodenoscopy transoral flex w/bx single or mult  02/07/2019    Chronic Gastritis - Dr Valle    Tonsillectomy and adenoidectomy      4 or 5 years old       Current Facility-Administered Medications   Medication    potassium phosphate/sodium phosphate (K PHOS NEUTRAL) tablet 250 mg    magnesium oxide (MAG-OX) tablet 400 mg    fluticasone (FLONASE) 50 MCG/ACT nasal spray 1 spray    sucralfate (CARAFATE) 1 GM/10ML suspension 1 g    butalbital-APAP-caffeine (FIORICET) -40 MG tablet 1 tablet    pantoprazole (PROTONIX) EC tablet 40 mg    hydrALAZINE (APRESOLINE) injection 10 mg    rifAXIMin (XIFAXAN) tablet 550 mg    zinc sulfate (ZINCATE) capsule 220 mg    potassium CHLORIDE (KLOR-CON M) neli ER tablet 20 mEq    hydrALAZINE (APRESOLINE) tablet 25 mg    vancomycin (VANCOCIN) 50 MG/ML (compounded) solution 125 mg    amLODIPine (NORVASC) tablet 10 mg    acetaminophen (TYLENOL) tablet 650 mg    lactated ringers infusion    cefTRIAXone (ROCEPHIN) syringe 2,000 mg    potassium CHLORIDE (KLOR-CON M) neli ER tablet 40 mEq    sodium chloride 0.9 % flush bag 25 mL    sodium chloride (PF) 0.9 % injection 2 mL    albuterol inhaler 2 puff    calcium carbonate (TUMS) chewable tablet 500 mg    HYDROcodone-acetaminophen (NORCO) 5-325 MG per tablet 1-2 tablet    venlafaxine XR (EFFEXOR XR) 24 hr capsule 150 mg    sodium chloride (NORMAL SALINE) 0.9 % bolus 500 mL    sodium chloride 0.9%  infusion    sodium chloride 0.9% infusion    dextrose 50 % injection 25 g    dextrose 50 % injection 12.5 g    glucagon (GLUCAGEN) injection 1 mg    dextrose (GLUTOSE) 40 % gel 15 g    dextrose (GLUTOSE) 40 % gel 30 g    gabapentin (NEURONTIN) capsule 300 mg    LORazepam (ATIVAN) injection 2 mg    Or    LORazepam (ATIVAN) injection 2 mg    sodium chloride 0.9 % flush bag 25 mL    Phosphorus Standard Replacement Protocol    Magnesium Standard Replacement Protocol    Potassium Standard Replacement Protocol       ALLERGIES:   Allergen Reactions    Morphine Other (See Comments)     Possible hallucinations         Family History   Problem Relation Age of Onset    Hypertension Mother        Social History     Socioeconomic History    Marital status: /Civil Union     Spouse name: Not on file    Number of children: 2    Years of education: Not on file    Highest education level: Not on file   Occupational History    Occupation:      Comment: [employer]   Tobacco Use    Smoking status: Current Every Day Smoker     Packs/day: 0.50     Years: 20.00     Pack years: 10.00     Types: Cigarettes     Start date: 11/1/1991    Smokeless tobacco: Never Used    Tobacco comment: she has patches to help stop smoking   Substance and Sexual Activity    Alcohol use: Yes     Alcohol/week: 8.0 standard drinks     Types: 4 Glasses of wine, 4 Standard drinks or equivalent per week     Comment: used to drink more, now is cutting back. - Pt states on 09/09/21 she quit. drinking 2.5 weeks ago related to liver disease.    Drug use: No    Sexual activity: Yes     Partners: Male     Birth control/protection: None   Other Topics Concern    Not on file   Social History Narrative    Not on file     Social Determinants of Health     Financial Resource Strain:     Social Determinants: Financial Resource Strain: Not on file   Food Insecurity:     Social Determinants: Food Insecurity: Not on file   Transportation Needs:      Lack of Transportation (Medical): Not on file    Lack of Transportation (Non-Medical): Not on file   Physical Activity:     Days of Exercise per Week: Not on file    Minutes of Exercise per Session: Not on file   Stress:     Social Determinants: Stress: Not on file   Social Connections:     Social Determinants: Social Connections: Not on file   Intimate Partner Violence: Not At Risk    Social Determinants: Intimate Partner Violence Past Fear: No    Social Determinants: Intimate Partner Violence Current Fear: No       Review of systems:  As above per the HPI.      Physical exam:  Visit Vitals  /66 (BP Location: LUE - Left upper extremity, Patient Position: Semi-[place]on[place]'s)   Pulse 100   Temp 98.4 °F (36.9 °C) (Oral)   Resp 20   Ht 5' 6\" (1.676 m)   Wt 66.5 kg (146 lb 9.6 oz)   LMP 05/24/2021   SpO2 100%   BMI 23.66 kg/m²     General: General appearance without acute distress  Abdomen: There is distension.   Psych: Mood and affect were appropriate. Judgement and insight appear appropriate    Labs:  WBC (K/mcL)   Date Value   10/17/2021 5.6   10/16/2021 5.6   10/16/2021 5.8     RBC (mil/mcL)   Date Value   10/17/2021 2.72 (L)   10/16/2021 2.81 (L)   10/16/2021 2.90 (L)     HCT (%)   Date Value   10/17/2021 23.9 (L)   10/16/2021 24.7 (L)   10/16/2021 25.5 (L)     HGB (g/dL)   Date Value   10/17/2021 8.5 (L)   10/16/2021 8.9 (L)   10/16/2021 8.7 (L)     PLT (K/mcL)   Date Value   10/17/2021 73 (L)   10/16/2021 79 (L)   10/16/2021 92 (L)     MCV (fl)   Date Value   10/17/2021 87.9   10/16/2021 87.9   10/16/2021 87.9     Sodium (mmol/L)   Date Value   10/17/2021 140   10/16/2021 138   10/16/2021 135     Chloride (mmol/L)   Date Value   10/17/2021 102   10/16/2021 100   10/15/2021 101     Carbon Dioxide (mmol/L)   Date Value   10/17/2021 31   10/16/2021 31   10/15/2021 26     BUN (mg/dL)   Date Value   10/17/2021 18   10/16/2021 28 (H)   10/15/2021 33 (H)     Creatinine (mg/dL)   Date Value   10/17/2021 0.54    10/16/2021 0.81   10/15/2021 1.74 (H)     Bilirubin, Total (mg/dL)   Date Value   10/17/2021 1.4 (H)   10/16/2021 1.9 (H)   10/15/2021 2.0 (H)     DIRECT BILIRUBIN (mg/dL)   Date Value   09/17/2019 0.2   09/14/2019 0.3 (H)     Bilirubin, Direct (mg/dL)   Date Value   06/18/2021 0.3 (H)     Alkaline Phosphatase (Units/L)   Date Value   10/17/2021 114   10/16/2021 87   10/15/2021 71     GOT/AST (Units/L)   Date Value   10/17/2021 271 (H)   10/16/2021 353 (H)   10/15/2021 166 (H)     GPT/ALT (Units/L)   Date Value   10/17/2021 269 (H)   10/16/2021 233 (H)   10/15/2021 79 (H)     Albumin (g/dL)   Date Value   10/17/2021 2.0 (L)   10/16/2021 2.0 (L)   10/15/2021 2.2 (L)     No results found for: LIPASE  No results found for: AMYLASE  INR (no units)   Date Value   10/14/2021 1.6   10/13/2021 2.0       US Liver W Duplex Limited   Final Result   IMPRESSION:       1. Diffuse hepatic steatosis.      2. Anterograde portal venous system.      XR Tube Check   Final Result   FINDINGS/IMPRESSION:        Enteric tube in place with the tip projecting over the stomach.      Visualized lung bases are clear.                  CT ABDOMEN PELVIS W CONTRAST   Final Result   IMPRESSION:          1.  Marked hepatic steatosis, as seen on multiple prior studies.   2.  Bulky uterine fibroids, similar to the prior study.   3.  Patchy atelectatic changes of the lung bases.  Given a slightly   peripheral groundglass appearance, underlying pneumonia/Covid pneumonia is   difficult to exclude.   4.  Additional stable findings, as above..      XR CHEST AP OR PA - PORTABLE   Final Result   IMPRESSION:       1.  No evidence of an acute cardiopulmonary process.         CT Head Level 1   Final Result   IMPRESSION:       Negative head CT.      These results were discussed with DR. BETSY DE LA TORRE over the telephone   on  10/13/2021 6:26 PM.              IR PICC    (Results Pending)        Yes

## 2024-04-23 NOTE — CONSULT NOTE ADULT - CONSULT REQUESTED BY NAME
[facility] 4 Month Well Child Check    ASSESSMENT & PLAN  Maris Morin is a 4 m.o. who hasnormal growth and normal development.    Diagnoses and all orders for this visit:    Encounter for routine child health examination without abnormal findings  -     DTaP HepB IPV combined vaccine IM  -     HiB PRP-T conjugate vaccine 4 dose IM  -     Pneumococcal conjugate vaccine 13-valent 6wks-17yrs; >50yrs  -     Rotavirus vaccine pentavalent 3 dose oral  -     Pediatric Development Testing        Return to clinic at 6 months or sooner as needed    IMMUNIZATIONS  Immunizations were reviewed and orders were placed as appropriate. and I have discussed the risks and benefits of all of the vaccine components with the patient/parents.  All questions have been answered.    ANTICIPATORY GUIDANCE  I have reviewed age appropriate anticipatory guidance.    HEALTH HISTORY  Do you have any concerns that you'd like to discuss today?: No concerns       Roomed by: DOMINGUEZ Koroma    Accompanied by Father    Refills needed? No    Do you have any forms that need to be filled out? No        Do you have any significant health concerns in your family history?: No  Family History   Problem Relation Age of Onset     Basal cell carcinoma Maternal Grandmother         Copied from mother's family history at birth     Osteopenia Maternal Grandmother         Copied from mother's family history at birth     Squamous cell carcinoma Maternal Grandmother         Did not go to lymph nodes (Copied from mother's family history at birth)     Diabetes type II Maternal Grandfather         Copied from mother's family history at birth     Has a lack of transportation kept you from medical appointments?: No    Who lives in your home?:  Parents and patient   Social History     Social History Narrative     Not on file     Do you have any concerns about losing your housing?: No  Is your housing safe and comfortable?: Yes  Who provides care for your child?:  at home and  "with relative    [place]  Depression Scale (EPDS) Risk Assessment: Not Completed- Birth mother not present      Feeding/Nutrition:  What does your child eat?: Mostly breast feeding for 12 min per side, 9x daily and 2-4 oz of Enfamil 1x daily   Is your child eating or drinking anything other than breast milk or formula?: No  Have you been worried that you don't have enough food?: No    Sleep:  How many times does your child wake in the night?: 2   In what position does your baby sleep:  back  Where does your baby sleep?:  crib    Elimination:  Do you have any concerns about your child's bowels or bladder (peeing, pooping, constipation?):  No    TB Risk Assessment:  Has your child had any of the following?:  no known risk of TB    VISION/HEARING  Do you have any concerns about your child's hearing?  No  Do you have any concerns about your child's vision?  No    DEVELOPMENT  Do you have any concerns about your child's development?  No  Screening tool used, reviewed with parent or guardian: No screening tool used  Milestones (by observation/ exam/ report) 75-90% ile   PERSONAL/ SOCIAL/COGNITIVE:    Smiles responsively    Looks at hands/feet    Recognizes familiar people  LANGUAGE:    Squeals,  coos    Responds to sound    Laughs  GROSS MOTOR:    Starting to roll    Bears weight    Head more steady  FINE MOTOR/ ADAPTIVE:    Hands together    Grasps rattle or toy    Eyes follow 180 degrees    Patient Active Problem List   Diagnosis     Term , current hospitalization     Single liveborn, born in hospital, delivered       MEASUREMENTS    Length: 24.21\" (61.5 cm) (35 %, Z= -0.40, Source: WHO (Girls, 0-2 years))  Weight: 12 lb (5.443 kg) (8 %, Z= -1.42, Source: WHO (Girls, 0-2 years))  OFC: 41 cm (16.14\") (59 %, Z= 0.23, Source: WHO (Girls, 0-2 years))    PHYSICAL EXAM    General appearance: alert, appears stated age Happy  Head: Normocephalic, without obvious abnormality  Eyes: conjunctivae/corneas clear. " Dr. Colvin PERRL, EOM's intact. Fundi benign.  Ears: normal TM's and external ear canals both ears  Throat: lips, mucosa, and tongue normal;   Neck: no adenopathy, supple, symmetrical, trachea midline and thyroid not enlarged, symmetric, no tenderness/mass/nodules  Lungs: clear to auscultation bilaterally  Heart: regular rate and rhythm, S1, S2 normal, no murmur, click, rub or gallop  Abdomen: soft, non-tender; bowel sounds normal; no masses,  no organomegaly  Extremities: extremities normal, atraumatic, no cyanosis or edema  Pulses: 2+ and symmetric  Lymph nodes: Cervical, supraclavicular, and axillary nodes normal.  Neurologic: Grossly earline

## 2024-04-23 NOTE — ED ADULT NURSE NOTE - CHIEF COMPLAINT QUOTE
pt c/o mid chest pain, since Saturday was seen in the ED told to come back if worse  A&Ox3, resp wnl, holding chest, "feels like chest is being squeezed", stated maybe SHEKHAR, left ED 9pm last night, was told if worse to come back & be admitted to OBS for cardiology

## 2024-04-23 NOTE — ED CDU PROVIDER DISPOSITION NOTE - PATIENT PORTAL LINK FT
You can access the FollowMyHealth Patient Portal offered by [facility] by registering at the following website: [email or web address]. By joining [email or web address]’s FollowMyHealth portal, you will also be able to view your health information using other applications (apps) compatible with our system.

## 2024-04-23 NOTE — ED ADULT NURSE NOTE - OBJECTIVE STATEMENT
pt. was dc'ed from this ED with diagnosis of PNA, pt. told to return condition worsened. pt. states she has felt increasing chest tightness with pain. pt. states difficulty taking deep breaths because of the pain. axo3 with equal and unlabored respirations.

## 2024-04-23 NOTE — ED CDU PROVIDER DISPOSITION NOTE - CARE PROVIDERS DIRECT ADDRESSES
[email or web address]

## 2024-04-23 NOTE — ED CDU PROVIDER DISPOSITION NOTE - CLINICAL COURSE
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 Patient was seen in ED last night, had negative trop and CTA. received morphine which helped her pain, went home, woke up with worsening crushing/squeezing substernal chest pain so came back to ED. Trop repeated <6. Reports pain is worse with lying flat and ambulating, improved with sitting upright. Pt placed in obs for TTE, [facility] following. ESR 26 CRP 89. TTE grossly unremarkable. Cardiology recommending pericarditis treatment and outpt f/u. 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 Patient was seen in ED last night, had negative trop and CTA. received morphine which helped her pain, went home, woke up with worsening crushing/squeezing substernal chest pain so came back to ED. Trop repeated <6. Reports pain is worse with lying flat and ambulating, improved with sitting upright. Pt placed in obs for TTE, [facility] following. ESR 26 CRP 89. TTE grossly unremarkable. Cardiology recommending pericarditis treatment and outpt f/u. chest pain improved during obs course. sent rx for PPI, ibuprofen and colchicine. all results d/w pt, provided copy of all results, return precautions  discussed.

## 2024-04-23 NOTE — ED PROVIDER NOTE - CLINICAL SUMMARY MEDICAL DECISION MAKING FREE TEXT BOX
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 Will treat for acid reflux, consult cardiology in AM. 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 Will treat for acid reflux, consult cardiology in AM.    Patient seen by cardiology, recommending TTE.  Patient placed in observation

## 2024-04-23 NOTE — CONSULT NOTE ADULT - PROBLEM SELECTOR RECOMMENDATION 9
Monitor on Tele overnight for acute arrhythmias, check labs including hsT-T, FLP and A1C in AM, check ECG  - low cholesterol diet  - TTE to assess functional/structural status  - Exercise stress test in AM  - keep NPO  - pain management as needed (NTG/MSO4)    case d/w Dr. Tirado Monitor on Tele overnight for acute arrhythmias, check labs including hsT-T, FLP and A1C in AM, check ECG  - low cholesterol diet  - TTE to assess functional/structural status  - outpatient Exercise stress test next week  - pain management as needed (NTG/MSO4)    case d/w Dr. Tirado

## 2024-04-23 NOTE — ED ADULT NURSE REASSESSMENT NOTE - NS ED NURSE REASSESS COMMENT FT1
Pt is resting in bed comfortably at this time, no apparent distress noted at this time. pt safety maintained. Pt denies any complaints at this time. pt updated on plan of care. Pt in nsr on monitor. Respirations even and unlabored. VSS.
pt care assumed at 0730, no apparent distress noted at this time, charting as noted. Pt received A&Ox4 resting in bed comfortably at this time. Pt states she feels pressure around head and chest which is baseline as per previous shift. Lung sounds clear bilaterally on ausculation. Pt in nsr on the cardiac monitor. VSS.
Assumed care of pt at 1225 as stated in report from RN MIGUEL. Charting as noted. Patient A&O x4, complains of CP medication ordered. Updated on the plan of care. Call bell within reach, bed locked in lowest position. IV site flushed w/ NS. No redness, swelling or pain noted to site. No signs of acute distress noted, safety maintained. Pt remains on CM in NSR.

## 2024-04-23 NOTE — ED ADULT NURSE NOTE - NSFALLUNIVINTERV_ED_ALL_ED
This note was copied from the mother's chart.     10/02/23 1111   Maternal Information   Person Making Referral physician  (Dr Champion mentioned mom would rather not bottle feed to supplement (supplement of 0.5oz ordered if no wet diapers in 6hrs once home))   Maternal Reason for Referral breastfeeding currently   Infant Reason for Referral other (see comments)  (patient reports she was originally hoping to hold off on bottle feeding until around 3 weeks, but is okay with supplementing by paced bottle feeding now; discussed supplemental nursing system, pt declined use at this time)   Maternal Assessment   Breast Size Issue none   Breast Shape Bilateral:;round   Breast Density Bilateral:;soft   Nipples Bilateral:;short;graspable   Left Nipple Symptoms tender   Right Nipple Symptoms tender   Maternal Infant Feeding   Infant Positioning cross-cradle   Signs of Milk Transfer deep jaw excursions noted;audible swallow   Pain with Feeding yes   Pain Location nipples, bilateral   Pain Description other (see comments)  (slight pinching)   Comfort Measures Before/During Feeding infant position adjusted;latch adjusted;maternal position adjusted;suction broken using finger   Nipple Shape After Feeding, Right lipstick shape   Latch Assistance verbal guidance offered   Support Person Involvement actively supporting mother   Breastfeeding Supplementation   Person Feeding Infant father   Infant Indication for Supplementation weight loss   Breastfeeding Supplementation Type expressed breast milk  (may choose to provide donor breastmilk after next feeding session)   Method of Supplementation syringe;finger   Breast Pumping   Breast Pumping Interventions post-feed pumping encouraged  (due to weight loss, for short/missed feedings, if supplementation is required, or if breastfeeding becomes too painful, to encourage breastmilk production)   Breast Pumping double electric breast pump utilized     All questions answered at this time. PRN  Lactation Consultant/Clinic contact encouraged.   Bed/Stretcher in lowest position, wheels locked, appropriate side rails in place/Call bell, personal items and telephone in reach/Instruct patient to call for assistance before getting out of bed/chair/stretcher/Non-slip footwear applied when patient is off stretcher/[place] to call system/Physically safe environment - no spills, clutter or unnecessary equipment/Purposeful proactive rounding/Room/bathroom lighting operational, light cord in reach

## 2024-04-23 NOTE — ED CDU PROVIDER INITIAL DAY NOTE - ATTENDING APP SHARED VISIT CONTRIBUTION OF CARE
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 Patient was seen in ED last night, had negative trop and CTA, by hx sounds like pericarditis though EKG without ST seg changes or OK depressions or other early pericarditis findings; will follow up cards recs, dispo pending clinical course and results

## 2024-04-23 NOTE — ED CDU PROVIDER INITIAL DAY NOTE - CLINICAL SUMMARY MEDICAL DECISION MAKING FREE TEXT BOX
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 Patient was seen in ED last night, had negative trop and CTA. received morphine which helped her pain, went home, woke up with worsening crushing/squeezing substernal chest pain so came back to ED. Trop repeated <6. Presentation concerning for pericarditis. ESR/CRP added. pending TTE, [facility] following

## 2024-04-23 NOTE — ED CDU PROVIDER DISPOSITION NOTE - NSFOLLOWUPINSTRUCTIONS_ED_ALL_ED_FT
- Return to the ED for any new or worsening symptoms.   - Take medication as directed  - Follow-up with cardiologist within 1-2 weeks    Chest Pain    Chest pain can be caused by many different conditions which may or may not be dangerous. Causes include heartburn, lung infections, heart attack, blood clot in lungs, skin infections, strain or damage to muscle, cartilage, or bones, etc. In addition to a history and physical examination, an electrocardiogram (ECG) or other lab tests may have been performed to determine the cause of your chest pain. Follow up with your primary care provider or with a cardiologist as instructed.     SEEK IMMEDIATE MEDICAL CARE IF YOU HAVE ANY OF THE FOLLOWING SYMPTOMS: worsening chest pain, coughing up blood, unexplained back/neck/jaw pain, severe abdominal pain, dizziness or lightheadedness, fainting, shortness of breath, sweaty or clammy skin, vomiting, or racing heart beat. These symptoms may represent a serious problem that is an emergency. Do not wait to see if the symptoms will go away. Get medical help right away. Call 911 and do not drive yourself to the hospital.

## 2024-04-23 NOTE — CONSULT NOTE ADULT - NS ATTEND AMEND GEN_ALL_CORE FT
37yo F w/ RUE DVT (5 years ago, not on AC), Pleurisy presents to [facility] for chest pain last 2 days.  Pain atypical in nature and likely replated to cold like symptoms (MSK??)       pericarditis  pleurisy  possibly cardiac chest pain  hx of RUE DVT      cardiac enzymes negative  telemetry without arrythmia  ekg non ischemic    obtain CRP and ESR    Pericarditis treatment if positive + PPI prophylaxis.    outpatient cardiologist appt. in 1-2 weeks.

## 2024-04-23 NOTE — CONSULT NOTE ADULT - ASSESSMENT
37yo F w/ RUE DVT (5 years ago, not on AC), Pleurisy presents to [facility] for chest pain last 2 days.   39yo F w/ RUE DVT (5 years ago, not on AC), Pleurisy presents to [facility] for chest pain last 2 days.  Pain atypical in nature and likely replated to cold like symptoms (MSK??)

## 2024-04-23 NOTE — ED CDU PROVIDER INITIAL DAY NOTE - PROGRESS NOTE DETAILS
echo results noted. pt started on nsaids, colchicine and PPI. pt feeling better at this time, laying down at ~30 degree incline. all results reviewed with pt. will re-assess and likely discharge if no changes/events

## 2024-04-23 NOTE — ED ADULT TRIAGE NOTE - CHIEF COMPLAINT QUOTE
pt c/o mid chest pain, since Saturday was seen in the ED told to come back if worse  A&Ox3, resp wnl, holding chest, "feels like chest is being squeezed" pt c/o mid chest pain, since Saturday was seen in the ED told to come back if worse  A&Ox3, resp wnl, holding chest, "feels like chest is being squeezed", stated maybe SHEKHAR, left ED 9pm last night pt c/o mid chest pain, since Saturday was seen in the ED told to come back if worse  A&Ox3, resp wnl, holding chest, "feels like chest is being squeezed", stated maybe SHEKHAR, left ED 9pm last night, was told if worse to come back & be admitted to OBS for cardiology

## 2024-04-23 NOTE — ED CDU PROVIDER DISPOSITION NOTE - CARE PROVIDER_API CALL
Jose Tirado  Cardiovascular Disease  [address] [facility], [address] [address]  Phone: [phone]  Fax: [phone]  Follow Up Time:

## 2024-04-23 NOTE — ED PROVIDER NOTE - ATTENDING CONTRIBUTION TO CARE
I performed a face to face bedside interview with patient regarding history of present illness, review of symptoms and past medical history. I completed an independent physical exam.  I have discussed patient's plan of care with resident.   I agree with note as stated above including HISTORY OF PRESENT ILLNESS, HIV, PAST MEDICAL/SURGICAL/FAMILY/SOCIAL HISTORY, ALLERGIES AND HOME MEDICATIONS, REVIEW OF SYSTEMS, PHYSICAL EXAM, MEDICAL DECISION MAKING and any PROGRESS NOTES during the time I functioned as the attending physician for this patient unless otherwise noted. My brief assessment is as follows:   General no acute distress respiratory clear cardiac no murmur abdomen soft neuro intact   agree with resident plan of care cardiac cardiology recs implemented

## 2024-04-23 NOTE — ED PROVIDER NOTE - OBJECTIVE STATEMENT
Patient is a 37yo F with PMHx RUE DVT (5 years ago, no longer on lovenox), hx pleurisy presents for chest pain x 2 days. Patient was seen here last night, got morphine which helped her pain, went home, woke up with worsening crushing/squeezing substernal chest pain so came back. +SOB. All work up yesterday including CTA for PE was negative.

## 2024-04-23 NOTE — ED CDU PROVIDER INITIAL DAY NOTE - OBJECTIVE STATEMENT
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 Patient was seen in ED last night, had negative trop and CTA. received morphine which helped her pain, went home, woke up with worsening crushing/squeezing substernal chest pain so came back to ED. Trop repeated <6. Reports pain is worse with lying flat and ambulating, improved with sitting upright. States she thinks she has hx of pericarditis around same time as pleurisy diagnosis but has not followed with cardiology since. Denies fever, chills, KURTZ, n/v, diaphoresis, LE edema, recent travel. Pt placed in obs for TTE, [facility] Cardiology following.

## 2024-04-23 NOTE — CONSULT NOTE ADULT - SUBJECTIVE AND OBJECTIVE BOX
[facility]                                              CARDIOLOGY AT [facility]                                                   [address] [address]                                             Telephone: [phone]. Fax:[phone]                                                       CARDIOLOGY CONSULTATION NOTE                                                                                             History obtained by: Patient and medical record  Community Cardiologist:  None    obtained: Yes [  ] No [  ]  Reason for Consultation: chest pain  Available out pt records reviewed: Yes [x] No [  ]    Chief complaint:  i have chest pain    HPI: Patient is a 37yo F with PMHx RUE DVT (5 years ago, no longer on lovenox), pleurisy presents to [facility] after developing chest pain for past 2 days.  Patient was seen here last night, got morphine which helped her pain, went home, woke up with worsening crushing/squeezing substernal chest pain so came back; admits to associated dyspnea.  All work up yesterday including CTA for PE was negative.  Denies diaphoresis, HA, nausea/vomiting, LE swelling, fever, chills, sick contacts, no personal cardiac hx, abdominal pain, dysuria, hematuria, or bloody stools.  +vaping daily      CARDIAC TESTING   ECHO:  STRESS:  CATH:   ELECTROPHYSIOLOGY:     PAST MEDICAL HISTORY  Pleurisy  PNA (pneumonia)  Arm DVT (deep venous thromboembolism), acute, right    PAST SURGICAL HISTORY  No significant past surgical history    SOCIAL HISTORY:  Denies smoking  +vaping, no alcohol/drugs    FAMILY HISTORY: MI father    Family History of Cardiovascular Disease:  Yes [x] No [  ]  Coronary Artery Disease in first degree relative: Yes [  ] No [  ]  Sudden Cardiac Death in First degree relative: Yes [  ] No [  ]    HOME MEDICATIONS:  LaMICtal 100 mg oral tablet: 1 tab(s) orally 2 times a day (22 Apr 2024 14:39)  Zoloft 100 mg oral tablet: 1 tab(s) orally 2 times a day (22 Apr 2024 14:39)    ALLERGIES: latex (Unknown)  No Known Drug Allergies    REVIEW OF SYMPTOMS:   CONSTITUTIONAL: No fever, no chills, no weight loss, no weight gain, no fatigue   ENMT:  No vertigo; No sinus or throat pain  NECK: No pain or stiffness  CARDIOVASCULAR: + chest pain, no dyspnea, no syncope/presyncope, no palpitations, no dizziness, no Orthopnea, no Paroxsymal nocturnal dyspnea  RESPIRATORY: no Shortness of breath, no cough, no wheezing  : No dysuria, no hematuria   GI: no nausea, no diarrhea, no constipation, no abdominal pain   NEURO: No headache, no slurred speech   MUSCULOSKELETAL: No joint pain or swelling  PSYCH: No agitation, no anxiety    ALL OTHER REVIEW OF SYSTEMS ARE NEGATIVE.    VITAL SIGNS:  T(C): 36.7 (04-23-24 @ 03:29), Max: 36.7 (04-22-24 @ 19:11)  T(F): 98.1 (04-23-24 @ 03:29), Max: 98.1 (04-22-24 @ 19:11)  HR: 101 (04-23-24 @ 03:29) (78 - 101)  BP: 118/70 (04-23-24 @ 03:29) (97/61 - 118/70)  RR: 18 (04-23-24 @ 03:29) (17 - 18)  SpO2: 98% (04-23-24 @ 03:29) (98% - 100%)    INTAKE AND OUTPUT:     PHYSICAL EXAM:  Constitutional: Comfortable, no acute distress   HEENT: Atraumatic, neck is supple, no JVD  CNS: A&Ox3. No focal deficits   Respiratory: CTAB, unlabored   Cardiovascular: RRR normal S1S2, no murmur or rubs  Gastrointestinal: Soft, non-tender   Extremities: 2+ Peripheral Pulses, no cyanosis, or edema  Psychiatric: Calm  Skin: Warm and dry, no ulcers on extremities     LABS:                          14.3   11.14 )-----------( 303      ( 22 Apr 2024 14:36 )             42.0     04-22    142  |  105  |  9.6  ----------------------------<  68<L>  4.1   |  28.0  |  0.67    Ca    9.3      22 Apr 2024 14:36    TPro  7.0  /  Alb  4.3  /  TBili  0.4  /  DBili  x   /  AST  14  /  ALT  11  /  AlkPhos  66  04-22    Urinalysis Basic - ( 22 Apr 2024 14:36 )  Color: x / Appearance: x / SG: x / pH: x  Gluc: 68 mg/dL / Ketone: x  / Bili: x / Urobili: x   Blood: x / Protein: x / Nitrite: x   Leuk Esterase: x / RBC: x / WBC x   Sq Epi: x / Non Sq Epi: x / Bacteria: x    INTERPRETATION OF TELEMETRY:   ECG:   Prior ECG: Yes [x] No [  ]    RADIOLOGY & ADDITIONAL STUDIES:   CT Angio scan: IMPRESSION: No evidence of pulmonary embolism.                                                    [facility]                                              CARDIOLOGY AT [place] [address]                                             Telephone: [phone]. Fax:[phone]                                                       CARDIOLOGY CONSULTATION NOTE                                                                                             History obtained by: Patient and medical record  Community Cardiologist:  None    obtained: Yes [  ] No [  ]  Reason for Consultation: chest pain  Available out pt records reviewed: Yes [x] No [  ]    Chief complaint:  i have chest pain    HPI: Patient is a 37yo F with PMHx RUE DVT (5 years ago, no longer on AC), Pleurisy presents to [facility] after developing L sided chest pain for past 2 days.  Patient was seen here last night, got morphine which helped her pain, went home, woke up with worsening crushing/squeezing L sided chest pain again, so she came back.  Reports cold like symptoms for past 3 days, stuffy nose and chest congestion w/ associated dyspnea.  Work up yesterday including CTA for PE was negative.  Denies diaphoresis, HA, nausea/vomiting, LE swelling, fever, chills, sick contacts, no personal cardiac hx, abdominal pain, dysuria, hematuria, or bloody stools.  Pain is sort of reproducible by manual pressure   +vaping daily   hsT-T: <6  ECG: ST no acute T or ST changes      CARDIAC TESTING   ECHO: Pen  STRESS:  CATH:   ELECTROPHYSIOLOGY:     PAST MEDICAL HISTORY  Pleurisy  PNA (pneumonia)  Arm DVT (deep venous thromboembolism), acute, right    PAST SURGICAL HISTORY  No significant past surgical history    SOCIAL HISTORY:  Denies smoking  +vaping, no alcohol/drugs    FAMILY HISTORY: MI father    Family History of Cardiovascular Disease:  Yes [x] No [  ]  Coronary Artery Disease in first degree relative: Yes [  ] No [  ]  Sudden Cardiac Death in First degree relative: Yes [  ] No [  ]    HOME MEDICATIONS:  LaMICtal 100 mg oral tablet: 1 tab(s) orally 2 times a day (22 Apr 2024 14:39)  Zoloft 100 mg oral tablet: 1 tab(s) orally 2 times a day (22 Apr 2024 14:39)    ALLERGIES: latex (Unknown)  No Known Drug Allergies    REVIEW OF SYMPTOMS:   CONSTITUTIONAL: No fever, no chills, no weight loss, no weight gain, no fatigue   ENMT:  No vertigo; No sinus or throat pain  NECK: No pain or stiffness  CARDIOVASCULAR: + chest pain, no dyspnea, no syncope/presyncope, no palpitations, no dizziness, no Orthopnea, no Paroxsymal nocturnal dyspnea  RESPIRATORY: no Shortness of breath, no cough, no wheezing  : No dysuria, no hematuria   GI: no nausea, no diarrhea, no constipation, no abdominal pain   NEURO: No headache, no slurred speech   MUSCULOSKELETAL: No joint pain or swelling  PSYCH: No agitation, no anxiety    ALL OTHER REVIEW OF SYSTEMS ARE NEGATIVE.    VITAL SIGNS:  T(C): 36.7 (04-23-24 @ 03:29), Max: 36.7 (04-22-24 @ 19:11)  T(F): 98.1 (04-23-24 @ 03:29), Max: 98.1 (04-22-24 @ 19:11)  HR: 101 (04-23-24 @ 03:29) (78 - 101)  BP: 118/70 (04-23-24 @ 03:29) (97/61 - 118/70)  RR: 18 (04-23-24 @ 03:29) (17 - 18)  SpO2: 98% (04-23-24 @ 03:29) (98% - 100%)    INTAKE AND OUTPUT:     PHYSICAL EXAM:  Constitutional: Comfortable, no acute distress   HEENT: Atraumatic, neck is supple, no JVD  CNS: A&Ox3. No focal deficits   MSK: + reproducible pain on manual pressure at left sternal border  Respiratory: CTAB, unlabored   Cardiovascular: RRR normal S1S2, no murmur or rubs  Gastrointestinal: Soft, non-tender   Extremities: 2+ Peripheral Pulses, no cyanosis, or edema  Psychiatric: Calm  Skin: Warm and dry, no ulcers on extremities     LABS:                          14.3   11.14 )-----------( 303      ( 22 Apr 2024 14:36 )             42.0     04-22    142  |  105  |  9.6  ----------------------------<  68<L>  4.1   |  28.0  |  0.67    Ca    9.3      22 Apr 2024 14:36    TPro  7.0  /  Alb  4.3  /  TBili  0.4  /  DBili  x   /  AST  14  /  ALT  11  /  AlkPhos  66  04-22    Urinalysis Basic - ( 22 Apr 2024 14:36 )  Color: x / Appearance: x / SG: x / pH: x  Gluc: 68 mg/dL / Ketone: x  / Bili: x / Urobili: x   Blood: x / Protein: x / Nitrite: x   Leuk Esterase: x / RBC: x / WBC x   Sq Epi: x / Non Sq Epi: x / Bacteria: x    INTERPRETATION OF TELEMETRY: SR no ectopy  ECG: ST@103bpm, no acute T or ST changes  Prior ECG: Yes [x] No [  ]    RADIOLOGY & ADDITIONAL STUDIES:   CT Angio scan: IMPRESSION: No evidence of pulmonary embolism.

## 2024-10-01 ENCOUNTER — NON-APPOINTMENT (OUTPATIENT)
Age: 39
End: 2024-10-01

## 2024-11-28 NOTE — ED ADULT NURSE NOTE - NS ED NURSE DC INFO COMPLEXITY
English Moderate: Comprehensive teaching/Patient asked questions/Returned Demonstration/Verbalized Understanding

## 2025-01-14 ENCOUNTER — NON-APPOINTMENT (OUTPATIENT)
Age: 40
End: 2025-01-14

## 2025-01-21 ENCOUNTER — NON-APPOINTMENT (OUTPATIENT)
Age: 40
End: 2025-01-21

## 2025-01-21 ENCOUNTER — EMERGENCY (EMERGENCY)
Facility: HOSPITAL | Age: 40
LOS: 1 days | Discharge: HOME | End: 2025-01-21
Attending: EMERGENCY MEDICINE
Payer: COMMERCIAL

## 2025-01-21 VITALS
SYSTOLIC BLOOD PRESSURE: 123 MMHG | RESPIRATION RATE: 16 BRPM | OXYGEN SATURATION: 100 % | HEART RATE: 80 BPM | DIASTOLIC BLOOD PRESSURE: 67 MMHG | WEIGHT: 179.9 LBS | TEMPERATURE: 98 F | HEIGHT: 68 IN

## 2025-01-21 DIAGNOSIS — B33.23 VIRAL PERICARDITIS: ICD-10-CM

## 2025-01-21 DIAGNOSIS — I31.9 DISEASE OF PERICARDIUM, UNSPECIFIED: ICD-10-CM

## 2025-01-21 PROBLEM — I82.621 ACUTE EMBOLISM AND THROMBOSIS OF DEEP VEINS OF RIGHT UPPER EXTREMITY: Chronic | Status: ACTIVE | Noted: 2024-04-22

## 2025-01-21 LAB
ALBUMIN SERPL ELPH-MCNC: 3.7 G/DL — SIGNIFICANT CHANGE UP (ref 3.3–5.2)
ALP SERPL-CCNC: 64 U/L — SIGNIFICANT CHANGE UP (ref 40–120)
ALT FLD-CCNC: 13 U/L — SIGNIFICANT CHANGE UP
ANION GAP SERPL CALC-SCNC: 14 MMOL/L — SIGNIFICANT CHANGE UP (ref 5–17)
AST SERPL-CCNC: 21 U/L — SIGNIFICANT CHANGE UP
BASOPHILS # BLD AUTO: 0.04 K/UL — SIGNIFICANT CHANGE UP (ref 0–0.2)
BASOPHILS NFR BLD AUTO: 0.5 % — SIGNIFICANT CHANGE UP (ref 0–2)
BILIRUB SERPL-MCNC: 0.4 MG/DL — SIGNIFICANT CHANGE UP (ref 0.4–2)
BUN SERPL-MCNC: 11 MG/DL — SIGNIFICANT CHANGE UP (ref 8–20)
CALCIUM SERPL-MCNC: 8.8 MG/DL — SIGNIFICANT CHANGE UP (ref 8.4–10.5)
CHLORIDE SERPL-SCNC: 105 MMOL/L — SIGNIFICANT CHANGE UP (ref 96–108)
CO2 SERPL-SCNC: 21 MMOL/L — LOW (ref 22–29)
CREAT SERPL-MCNC: 0.83 MG/DL — SIGNIFICANT CHANGE UP (ref 0.5–1.3)
D DIMER BLD IA.RAPID-MCNC: <150 NG/ML DDU — SIGNIFICANT CHANGE UP
EGFR: 91 ML/MIN/1.73M2 — SIGNIFICANT CHANGE UP
EOSINOPHIL # BLD AUTO: 0.8 K/UL — HIGH (ref 0–0.5)
EOSINOPHIL NFR BLD AUTO: 10.8 % — HIGH (ref 0–6)
ERYTHROCYTE [SEDIMENTATION RATE] IN BLOOD: 9 MM/HR — SIGNIFICANT CHANGE UP (ref 0–20)
GLUCOSE SERPL-MCNC: 79 MG/DL — SIGNIFICANT CHANGE UP (ref 70–99)
HCT VFR BLD CALC: 39 % — SIGNIFICANT CHANGE UP (ref 34.5–45)
HGB BLD-MCNC: 12.8 G/DL — SIGNIFICANT CHANGE UP (ref 11.5–15.5)
IMM GRANULOCYTES NFR BLD AUTO: 0.8 % — SIGNIFICANT CHANGE UP (ref 0–0.9)
LYMPHOCYTES # BLD AUTO: 2.54 K/UL — SIGNIFICANT CHANGE UP (ref 1–3.3)
LYMPHOCYTES # BLD AUTO: 34.2 % — SIGNIFICANT CHANGE UP (ref 13–44)
MCHC RBC-ENTMCNC: 29.8 PG — SIGNIFICANT CHANGE UP (ref 27–34)
MCHC RBC-ENTMCNC: 32.8 G/DL — SIGNIFICANT CHANGE UP (ref 32–36)
MCV RBC AUTO: 90.7 FL — SIGNIFICANT CHANGE UP (ref 80–100)
MONOCYTES # BLD AUTO: 0.5 K/UL — SIGNIFICANT CHANGE UP (ref 0–0.9)
MONOCYTES NFR BLD AUTO: 6.7 % — SIGNIFICANT CHANGE UP (ref 2–14)
NEUTROPHILS # BLD AUTO: 3.49 K/UL — SIGNIFICANT CHANGE UP (ref 1.8–7.4)
NEUTROPHILS NFR BLD AUTO: 47 % — SIGNIFICANT CHANGE UP (ref 43–77)
NT-PROBNP SERPL-SCNC: 225 PG/ML — SIGNIFICANT CHANGE UP (ref 0–300)
PLATELET # BLD AUTO: 283 K/UL — SIGNIFICANT CHANGE UP (ref 150–400)
POTASSIUM SERPL-MCNC: 4 MMOL/L — SIGNIFICANT CHANGE UP (ref 3.5–5.3)
POTASSIUM SERPL-SCNC: 4 MMOL/L — SIGNIFICANT CHANGE UP (ref 3.5–5.3)
PROT SERPL-MCNC: 6.6 G/DL — SIGNIFICANT CHANGE UP (ref 6.6–8.7)
RBC # BLD: 4.3 M/UL — SIGNIFICANT CHANGE UP (ref 3.8–5.2)
RBC # FLD: 12.6 % — SIGNIFICANT CHANGE UP (ref 10.3–14.5)
SODIUM SERPL-SCNC: 140 MMOL/L — SIGNIFICANT CHANGE UP (ref 135–145)
TROPONIN T, HIGH SENSITIVITY RESULT: <6 NG/L — SIGNIFICANT CHANGE UP (ref 0–51)
TROPONIN T, HIGH SENSITIVITY RESULT: <6 NG/L — SIGNIFICANT CHANGE UP (ref 0–51)
WBC # BLD: 7.43 K/UL — SIGNIFICANT CHANGE UP (ref 3.8–10.5)
WBC # FLD AUTO: 7.43 K/UL — SIGNIFICANT CHANGE UP (ref 3.8–10.5)

## 2025-01-21 PROCEDURE — 99223 1ST HOSP IP/OBS HIGH 75: CPT

## 2025-01-21 PROCEDURE — 71045 X-RAY EXAM CHEST 1 VIEW: CPT | Mod: 26

## 2025-01-21 PROCEDURE — 93010 ELECTROCARDIOGRAM REPORT: CPT

## 2025-01-21 RX ORDER — MORPHINE SULFATE 15 MG
2 TABLET, EXTENDED RELEASE ORAL ONCE
Refills: 0 | Status: DISCONTINUED | OUTPATIENT
Start: 2025-01-21 | End: 2025-01-21

## 2025-01-21 RX ORDER — SODIUM CHLORIDE 9 MG/ML
1000 INJECTION, SOLUTION INTRAVENOUS ONCE
Refills: 0 | Status: COMPLETED | OUTPATIENT
Start: 2025-01-21 | End: 2025-01-21

## 2025-01-21 RX ORDER — KETOROLAC TROMETHAMINE 30 MG/ML
30 INJECTION INTRAMUSCULAR; INTRAVENOUS ONCE
Refills: 0 | Status: DISCONTINUED | OUTPATIENT
Start: 2025-01-21 | End: 2025-01-21

## 2025-01-21 RX ADMIN — Medication 2 MILLIGRAM(S): at 21:00

## 2025-01-21 RX ADMIN — KETOROLAC TROMETHAMINE 30 MILLIGRAM(S): 30 INJECTION INTRAMUSCULAR; INTRAVENOUS at 16:12

## 2025-01-21 RX ADMIN — Medication 2 MILLIGRAM(S): at 16:11

## 2025-01-21 RX ADMIN — SODIUM CHLORIDE 1000 MILLILITER(S): 9 INJECTION, SOLUTION INTRAVENOUS at 16:11

## 2025-01-21 NOTE — ED CDU PROVIDER INITIAL DAY NOTE - OBJECTIVE STATEMENT
40yo female with pmhx pericarditis 4x presented to ED c/o fever, congestion, body aches x 2 weeks, 1 week of chest pain worse with deep inspiration. Patient endorsed that the chest pain was better with sitting up and leaning forward. Pt states this feels like her previous episodes of pericarditis.

## 2025-01-21 NOTE — ED CDU PROVIDER INITIAL DAY NOTE - ATTENDING APP SHARED VISIT CONTRIBUTION OF CARE
I, Ruben Richardson, performed a face to face bedside interview with this patient regarding history of present illness, and completed an independent physical examination. I personally made/approved the management plan and take responsibility for the patient management. I have communicated the patient’s plan of care and disposition with the ACP.  39 year old female with PMH lupus and 4 prior episodes of pericarditis presents with sharp chest pain consistent with prior episodes of pericarditis. Pt also noted to be in sinus vladimir to 40s, intermittently symptomatic  Gen: NAD, well appearing  CV: RRR  Pul: CTA b/l  Abd: Soft, non-distended, non-tender  Neuro: no focal deficits

## 2025-01-21 NOTE — ED PROVIDER NOTE - TEMPLATE, MLM
In the next few weeks you may receive a [employer] survey regarding your most recent clinic visit with us.  Please take a few moments out of your day to accurately evaluate your visit.  We strive to provide you with the best medical care.  Again, thank you for your time and we look forward to your next visit.    If we need to contact you regarding any test results, we will make 2 attempts to reach you at the number you have listed during your office visit today. If we are unable to reach you, a letter with your results and any further instructions will be mailed to you home.    Please do not hesitate to call our office with any questions or concerns   [phone]  Patient was given For Your Well Being patient instruction of [facility] - [phone] [id number].    
General

## 2025-01-21 NOTE — CONSULT NOTE ADULT - ASSESSMENT
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 She has family history of RA and lupus (mom), and otherwise has no medical history. She has been sick for 2 weeks with a waxing and waning infection with intermittent fevers and nasal congestion. Today she noticed dizziness, loss of balance, and chest pain which is positional which she recognized were related to her previous pericarditis presentations so she decided to go to urgent care. They found new onset sinus bradycardia sustained 40s which prompted ED transfer to [facility] (pt refused EMS transfer, drove herself to the hospital). [facility] Cardiology consulted. Of note pt was bitten by a dog 2 weeks ago but states the dog has been vaccinated for rabies.

## 2025-01-21 NOTE — ED CDU PROVIDER INITIAL DAY NOTE - CLINICAL SUMMARY MEDICAL DECISION MAKING FREE TEXT BOX
40yo female with pmhx pericarditis 4x presented to ED c/o fever, congestion, body aches x 2 weeks, 1 week of chest pain worse with deep inspiration. Patient endorsed that the chest pain was better with sitting up and leaning forward. Pt states this feels like her previous episodes of pericarditis. Dimer, ESR, Trop, BNP negative. CXR negative. Cardiology consulted and recommending TTE and CT coronary tomorrow. Pt placed into observation for pending TTE, CT coronary, and tele monitoring.

## 2025-01-21 NOTE — CONSULT NOTE ADULT - PROBLEM SELECTOR RECOMMENDATION 9
- pt w/ history of pericarditis x 5 instances   - has never followed with cardiology   - she has been sick for 2 weeks with fevers on and off despite a course of amox/clav   - pBNP is 225, pt is euvolemic on exam   - HCG negative   - Trop -x 1, continue to trend for 2 more sets, check serial EKGS with troponin   - EKG non ischemic, telemetry showing SB, no evidence of heart block   - ESR and CRP are normal   - D-Dimer is normal   - labs are not consistent with pericarditis, we will check echo and r/o other etiologies  - check CCTA in AM r/o underlying ischemia   - check TSH   - consider underlying infection. doubt rabies given dog was vaccinated, but pt has site tenderness at joint where she was bitten and the joint is swollen as well. Consider blood culture, ID evaluation, consider CT hand w/ IV contrast r/o deep abscess, consider hand surgeon evaluation.   - consider broadening abx if amoxicillin clavulonate failed as outpatient, pt reporting being febrile this AM  - check RVP   - monitor overnight on telemetry, for any events on telemetry call [phone] for follow up  - we will follow - pt w/ history of pericarditis x 5 instances   - has never followed with cardiology   - she has been sick for 2 weeks with fevers on and off despite a course of amox/clav   - pBNP is 225, pt is euvolemic on exam   - HCG negative   - Trop -x 1, continue to trend for 2 more sets, check serial EKGS with troponin   - EKG non ischemic, telemetry showing SB, no evidence of heart block   - ESR resulted as normal, but CRP was not sent. Check CRP. ESR is chronic marker so it potentially could be low with elevated CRP.   - if CRP is elevated start colchicine 0.6mg PO BID w/ ibuprofen 600mg PO TID, w/ pantoprazole 40mg PO QD. We will not start drugs until CRP is drawn and resulted.   - D-Dimer is normal   - labs are not consistent with pericarditis, we will check echo and r/o other etiologies  - check CCTA in AM r/o underlying ischemia   - check TSH   - consider underlying infection. doubt rabies given dog was vaccinated, but pt has site tenderness at joint where she was bitten and the joint is swollen as well. Consider blood culture, ID evaluation, consider CT hand w/ IV contrast r/o deep abscess, consider hand surgeon evaluation.   - consider broadening abx if amoxicillin clavulonate failed as outpatient, pt reporting being febrile this AM  - check RVP   - monitor overnight on telemetry, for any events on telemetry call [phone] for follow up  - we will follow

## 2025-01-21 NOTE — ED PROVIDER NOTE - PHYSICAL EXAMINATION
General: NAD  Head: NC, AT  EENT: EOMI, no scleral icterus  Cardiac: bradycardic but regular, no apparent murmurs, no lower extremity edema  Respiratory: CTABL, no respiratory distress   Abdomen: soft, ND, NT, nonperitonitic  MSK/Vascular: full ROM, soft compartments, warm extremities, 2+ peripheral pulses b/l  Neuro: AAOx3, sensation to light touch intact  Psych: calm, cooperative

## 2025-01-21 NOTE — ED ADULT NURSE REASSESSMENT NOTE - NS ED NURSE REASSESS COMMENT FT1
Assumed care of pt at 0730, pt is AOx3 NAD noted, pt resting comfortably in bed, side rails up and wheels locked. Pt breathing even and unlabored, pt denies any SOB or HA. Pt is awaiting placement in OBS, pt maintained on CM presenting with sinus bradycardia, and SPO2 97% on room air, pt evaluated by MD Richardson, POC explained, pt verbalized understanding and has no acute complaints at this time. No heavy lifting or pushing/pulling with procedure arm for 2 weeks. No driving for 2 days. You may shower 24 hours following the procedure but avoid baths/swimming for 1 week. Check your wrist site for bleeding and/or swelling daily following procedure and call your doctor immediately if it occurs or if you experience increased pain at the site. Follow up with your cardiologist in 1-2 weeks. You may call [facility] if you have any questions/concerns regarding your procedure [phone].

## 2025-01-21 NOTE — CONSULT NOTE ADULT - NS ATTEND AMEND GEN_ALL_CORE FT
Patient presented with chest pain, atypical no shortness of breath or cough   TTE done shows normal LVEF with no significant valvulopathy   ESR/CRP - normal   Pending results of the CCTA if normal can be discharged home     Edita Yancey D.O. [facility]  Cardiology/Vascular Cardiology -[facility]   Telephone # [phone] Patient presented with chest pain, atypical no shortness of breath or cough   TTE done shows normal LVEF with no significant valvulopathy   ESR/CRP - normal   Pending results of the CCTA if normal can be discharged home   Symptoms are consistent with pericarditis and has been having intermittent symptoms over the past couple of years which occurs with viral infection  Discussed to start Colchicine + high dose IBU - discussed compliance   in the outpatient would recommend cMRI and outpatient MCOT for 3 days to assess the bradycardia   CCTA done shows non obstructive CAD in the mid LAD     Edita Yancey D.O. [facility]  Cardiology/Vascular Cardiology -[facility]   Telephone # [phone]

## 2025-01-21 NOTE — CONSULT NOTE ADULT - SUBJECTIVE AND OBJECTIVE BOX
[facility]                                              CARDIOLOGY AT [facility]                                                   [address], [place] [address]                                             Telephone: [phone]. 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 She has family history of RA and lupus (mom), and otherwise has no medical history. She has been sick for 2 weeks with a waxing and waning infection with intermittent fevers and nasal congestion. Today she noticed dizziness, loss of balance, and chest pain which is positional which she recognized were related to her previous pericarditis presentations so she decided to go to urgent care. They found new onset sinus bradycardia sustained 40s which prompted ED transfer to [facility] (pt refused EMS transfer, drove herself to the hospital). [facility] Cardiology consulted. Of note pt was bitten by a dog 2 weeks ago but states the dog has been vaccinated for rabies.     Review of symptoms:   Cardiac:  No chest pain. No dyspnea. No palpitations.  Respiratory: no cough. No dyspnea  Gastrointestinal: No diarrhea. No abdominal pain. No bleeding.   Neuro: No focal neuro complaints.  All other ROS negative unless otherwise listed above    PHYSICAL EXAM:  Appearance: Comfortable. No acute distress  HEENT:  Atraumatic. Normocephalic.  Normal oral mucosa  Neurologic: A & O x 3, no gross focal deficits.  Cardiovascular: RRR S1 S2, No murmur, no rubs/gallops. No JVD  Respiratory: Lungs clear to auscultation, unlabored   Gastrointestinal:  Soft, Non-tender, + BS  Lower Extremities: 2+ Peripheral Pulses, No clubbing, cyanosis, or edema  Right hand: healing laceration of middle finger, swollen joints, tender to touch.   Psychiatry: Patient is calm. No agitation.   Skin: warm and dry.    PAST MEDICAL HISTORY  Pleurisy    PNA (pneumonia)    Arm DVT (deep venous thromboembolism), acute, right    PAST SURGICAL HISTORY  No significant past surgical history    SUBSTANCE USE HISTORY  Denies current and previous substance use [  ]   CIGARETTES -   ALCOHOL -   DRUGS -     FAMILY HISTORY:    CARDIAC SPECIFIC FAMILY HX   No KNOWN family history of Cardiovascular disease, CAD, or sudden death in first degree relatives unless specified below  Family History of Cardiovascular Disease:  [  ]   Coronary Artery Disease in first degree relative:  [  ]   Sudden Cardiac Death in First degree relative: [  ]    HOME MEDICATIONS:  LaMICtal 100 mg oral tablet: 1 tab(s) orally 2 times a day (23 Apr 2024 09:24)  Zoloft 100 mg oral tablet: 1 tab(s) orally once a day (23 Apr 2024 09:24)    CURRENT CARDIAC MEDICATIONS:    CURRENT OTHER MEDICATIONS:    ALLERGIES:   latex (Unknown)  Vicodin (Hives)    VITAL SIGNS:  T(C): 36.4 (01-21-25 @ 15:14), Max: 36.6 (01-21-25 @ 13:00)  T(F): 97.5 (01-21-25 @ 15:14), Max: 97.9 (01-21-25 @ 13:00)  HR: 58 (01-21-25 @ 15:14) (58 - 80)  BP: 121/82 (01-21-25 @ 15:14) (121/82 - 123/67)  RR: 18 (01-21-25 @ 15:14) (16 - 18)  SpO2: 98% (01-21-25 @ 15:14) (98% - 100%)    INTAKE AND OUTPUT:       LABS:                            12.8   7.43  )-----------( 283      ( 21 Jan 2025 15:12 )             39.0     01-21    140  |  105  |  11.0  ----------------------------<  79  4.0   |  21.0[L]  |  0.83    Ca    8.8      21 Jan 2025 15:12    TPro  6.6  /  Alb  3.7  /  TBili  0.4  /  DBili  x   /  AST  21  /  ALT  13  /  AlkPhos  64  01-21      Urinalysis Basic - ( 21 Jan 2025 15:12 )    Color: x / Appearance: x / SG: x / pH: x  Gluc: 79 mg/dL / Ketone: x  / Bili: x / Urobili: x   Blood: x / Protein: x / Nitrite: x   Leuk Esterase: x / RBC: x / WBC x   Sq Epi: x / Non Sq Epi: x / Bacteria: x    RADIOLOGY IMAGING:   CT Angio Cardiac w/ IV Cont: Urgent   Indication: Chest pain  Transport: Stret[facility]  Provider's Contact #: [phone] (01-21-25 @ 17:38) [Ordered.]  Xray Chest 1 View- PORTABLE-Urgent: Urgent   Indication: Chest Pain  Transport: Portable  Exam Completed (01-21-25 @ 15:27) [Performed]  12 Lead ECG:   Provider's Contact #: [phone] (01-21-25 @ 13:10) [Results Available]

## 2025-01-21 NOTE — ED PROVIDER NOTE - CLINICAL SUMMARY MEDICAL DECISION MAKING FREE TEXT BOX
40 y/o female presenting with pleuritic left sided CP and sob in the setting of two weeks of fevers, body aches, cough, congestion. Feels similar to prior episodes of pericarditis. Dimer, ESR, Trop, and BNP all negative. CXR w/out consolidation or infiltrates. Cardiology consulted. Recommending TTE and CT Coronary tomorrow.

## 2025-01-21 NOTE — ED PROVIDER NOTE - WET READ LAUNCH FT
Addended by: ERIC DUARTE on: 11/5/2024 02:18 PM     Modules accepted: Orders    
There are no Wet Read(s) to document.

## 2025-01-21 NOTE — ED PROVIDER NOTE - OBJECTIVE STATEMENT
39-year-old female with a past medical history of pericarditis presenting to the emergency room with 2 weeks of fevers, congestion, body aches developing 1 week of left sided CP, worse with deep breathing, relieved by sitting up and leaning forward. Pt states this is similar to her prior episodes of pericarditis which she states has occurred five times. Never followed up with cardiology. At urgent care had sinus bradycardia and was sent to the ER for evaluation.

## 2025-01-21 NOTE — ED ADULT NURSE NOTE - HISTORY OF COVID-19 VACCINATION
----- Message from Anamika Workman MD sent at 10/6/2024  3:56 PM CDT -----  Please notify pt that the A1C has improved, now 6. The bmp is stable with slightly improved renal function.  She should continue to stay well hydrated, and avoid anti inflammatories to protect the kidneys  Anamika Workman MD    Vaccine status unknown

## 2025-01-21 NOTE — ED ADULT TRIAGE NOTE - CHIEF COMPLAINT QUOTE
Sent in from  for bardycardia with HR in 40s. Has sinus infection and developed  dizziness, CP last week. Reports this feels the same as last time she developed pericarditis.

## 2025-01-22 ENCOUNTER — RESULT REVIEW (OUTPATIENT)
Age: 40
End: 2025-01-22

## 2025-01-22 VITALS
HEART RATE: 52 BPM | DIASTOLIC BLOOD PRESSURE: 75 MMHG | OXYGEN SATURATION: 98 % | SYSTOLIC BLOOD PRESSURE: 115 MMHG | TEMPERATURE: 97 F | RESPIRATION RATE: 20 BRPM

## 2025-01-22 LAB
RAPID RVP RESULT: DETECTED
RSV RNA SPEC QL NAA+PROBE: DETECTED
SARS-COV-2 RNA SPEC QL NAA+PROBE: SIGNIFICANT CHANGE UP
TROPONIN T, HIGH SENSITIVITY RESULT: <6 NG/L — SIGNIFICANT CHANGE UP (ref 0–51)

## 2025-01-22 PROCEDURE — 0225U NFCT DS DNA&RNA 21 SARSCOV2: CPT

## 2025-01-22 PROCEDURE — 85379 FIBRIN DEGRADATION QUANT: CPT

## 2025-01-22 PROCEDURE — 85025 COMPLETE CBC W/AUTO DIFF WBC: CPT

## 2025-01-22 PROCEDURE — 84443 ASSAY THYROID STIM HORMONE: CPT

## 2025-01-22 PROCEDURE — 96376 TX/PRO/DX INJ SAME DRUG ADON: CPT | Mod: XU

## 2025-01-22 PROCEDURE — 36415 COLL VENOUS BLD VENIPUNCTURE: CPT

## 2025-01-22 PROCEDURE — 96361 HYDRATE IV INFUSION ADD-ON: CPT

## 2025-01-22 PROCEDURE — 84702 CHORIONIC GONADOTROPIN TEST: CPT

## 2025-01-22 PROCEDURE — 75574 CT ANGIO HRT W/3D IMAGE: CPT | Mod: MC

## 2025-01-22 PROCEDURE — 85652 RBC SED RATE AUTOMATED: CPT

## 2025-01-22 PROCEDURE — 84484 ASSAY OF TROPONIN QUANT: CPT

## 2025-01-22 PROCEDURE — 96374 THER/PROPH/DIAG INJ IV PUSH: CPT | Mod: XU

## 2025-01-22 PROCEDURE — 71045 X-RAY EXAM CHEST 1 VIEW: CPT

## 2025-01-22 PROCEDURE — 96375 TX/PRO/DX INJ NEW DRUG ADDON: CPT | Mod: XU

## 2025-01-22 PROCEDURE — 99238 HOSP IP/OBS DSCHRG MGMT 30/<: CPT

## 2025-01-22 PROCEDURE — 83880 ASSAY OF NATRIURETIC PEPTIDE: CPT

## 2025-01-22 PROCEDURE — 80053 COMPREHEN METABOLIC PANEL: CPT

## 2025-01-22 PROCEDURE — 86140 C-REACTIVE PROTEIN: CPT

## 2025-01-22 PROCEDURE — G0378: CPT

## 2025-01-22 PROCEDURE — 93306 TTE W/DOPPLER COMPLETE: CPT

## 2025-01-22 PROCEDURE — 93005 ELECTROCARDIOGRAM TRACING: CPT

## 2025-01-22 PROCEDURE — 75574 CT ANGIO HRT W/3D IMAGE: CPT | Mod: 26

## 2025-01-22 PROCEDURE — 99284 EMERGENCY DEPT VISIT MOD MDM: CPT

## 2025-01-22 PROCEDURE — 93306 TTE W/DOPPLER COMPLETE: CPT | Mod: 26

## 2025-01-22 PROCEDURE — 99285 EMERGENCY DEPT VISIT HI MDM: CPT | Mod: 25

## 2025-01-22 RX ORDER — IBUPROFEN 200 MG
600 TABLET ORAL
Refills: 0 | Status: DISCONTINUED | OUTPATIENT
Start: 2025-01-22 | End: 2025-01-29

## 2025-01-22 RX ORDER — KETOROLAC TROMETHAMINE 30 MG/ML
15 INJECTION INTRAMUSCULAR; INTRAVENOUS ONCE
Refills: 0 | Status: DISCONTINUED | OUTPATIENT
Start: 2025-01-22 | End: 2025-01-22

## 2025-01-22 RX ORDER — OXYCODONE HCL 15 MG
5 TABLET ORAL ONCE
Refills: 0 | Status: DISCONTINUED | OUTPATIENT
Start: 2025-01-22 | End: 2025-01-22

## 2025-01-22 RX ORDER — BENZONATATE 100 MG
100 CAPSULE ORAL EVERY 8 HOURS
Refills: 0 | Status: DISCONTINUED | OUTPATIENT
Start: 2025-01-22 | End: 2025-01-29

## 2025-01-22 RX ORDER — DOXYCYCLINE MONOHYDRATE 100 MG
100 TABLET ORAL EVERY 12 HOURS
Refills: 0 | Status: DISCONTINUED | OUTPATIENT
Start: 2025-01-22 | End: 2025-01-29

## 2025-01-22 RX ORDER — PANTOPRAZOLE 40 MG/1
40 TABLET, DELAYED RELEASE ORAL
Refills: 0 | Status: DISCONTINUED | OUTPATIENT
Start: 2025-01-22 | End: 2025-01-29

## 2025-01-22 RX ORDER — SODIUM CHLORIDE 9 MG/ML
2000 INJECTION, SOLUTION INTRAMUSCULAR; INTRAVENOUS; SUBCUTANEOUS ONCE
Refills: 0 | Status: COMPLETED | OUTPATIENT
Start: 2025-01-22 | End: 2025-01-22

## 2025-01-22 RX ORDER — SODIUM CHLORIDE 9 MG/ML
1000 INJECTION, SOLUTION INTRAMUSCULAR; INTRAVENOUS; SUBCUTANEOUS ONCE
Refills: 0 | Status: COMPLETED | OUTPATIENT
Start: 2025-01-22 | End: 2025-01-22

## 2025-01-22 RX ORDER — OXYCODONE HCL 15 MG
5 TABLET ORAL EVERY 6 HOURS
Refills: 0 | Status: DISCONTINUED | OUTPATIENT
Start: 2025-01-22 | End: 2025-01-22

## 2025-01-22 RX ADMIN — SODIUM CHLORIDE 1000 MILLILITER(S): 9 INJECTION, SOLUTION INTRAMUSCULAR; INTRAVENOUS; SUBCUTANEOUS at 16:47

## 2025-01-22 RX ADMIN — Medication 600 MILLIGRAM(S): at 06:26

## 2025-01-22 RX ADMIN — Medication 100 MILLIGRAM(S): at 08:01

## 2025-01-22 RX ADMIN — SODIUM CHLORIDE 2000 MILLILITER(S): 9 INJECTION, SOLUTION INTRAMUSCULAR; INTRAVENOUS; SUBCUTANEOUS at 12:47

## 2025-01-22 RX ADMIN — Medication 5 MILLIGRAM(S): at 16:51

## 2025-01-22 RX ADMIN — Medication 600 MILLIGRAM(S): at 16:23

## 2025-01-22 RX ADMIN — SODIUM CHLORIDE 2000 MILLILITER(S): 9 INJECTION, SOLUTION INTRAMUSCULAR; INTRAVENOUS; SUBCUTANEOUS at 13:47

## 2025-01-22 RX ADMIN — Medication 2 MILLIGRAM(S): at 01:54

## 2025-01-22 RX ADMIN — Medication 600 MILLIGRAM(S): at 07:03

## 2025-01-22 RX ADMIN — Medication 5 MILLIGRAM(S): at 08:01

## 2025-01-22 RX ADMIN — KETOROLAC TROMETHAMINE 15 MILLIGRAM(S): 30 INJECTION INTRAMUSCULAR; INTRAVENOUS at 19:01

## 2025-01-22 RX ADMIN — PANTOPRAZOLE 40 MILLIGRAM(S): 40 TABLET, DELAYED RELEASE ORAL at 08:01

## 2025-01-22 RX ADMIN — Medication 5 MILLIGRAM(S): at 10:23

## 2025-01-22 RX ADMIN — KETOROLAC TROMETHAMINE 15 MILLIGRAM(S): 30 INJECTION INTRAMUSCULAR; INTRAVENOUS at 16:47

## 2025-01-22 RX ADMIN — Medication 600 MILLIGRAM(S): at 13:15

## 2025-01-22 NOTE — ED ADULT NURSE REASSESSMENT NOTE - NS ED NURSE REASSESS COMMENT FT1
Pt is AOx3 NAD noted, pt resting comfortably in bed, side rails up and wheels locked. Pt breathing even and unlabored, pt denies any SOB or HA. Pt is awaiting cardio consult, pt maintained on CM and SPO2, POC explained, pt verbalized understanding and has no acute complaints at this time.

## 2025-01-22 NOTE — ED CDU PROVIDER SUBSEQUENT DAY NOTE - HISTORY
Pt resting comfortably at time of re-assessment. No events overnight. Pending TTE, CT coronary. Will continue to monitor.

## 2025-01-22 NOTE — ED CDU PROVIDER SUBSEQUENT DAY NOTE - PROGRESS NOTE DETAILS
39-year-old female who presented to the emergency department left-sided chest discomfort worse with inspiration relieved by sitting up and leaning forward, reported similar to her past experiences with pericarditis I been placed into observation following initial ED evaluation and cardiology consultation ordered for echocardiogram and CT CTA of which were completed while in observation.  Of note was found to be slightly bradycardic and hypotensive following cardiac imaging with improvement of blood pressure status post fluid bolus and heart rate stable within the mid 40s to 50s.  CTCA reviewed by cardiology as well as echocardiogram without plans for further intervention while in the emergency department, requesting for reinitiation of colchicine for the next 3 months as well as NSAID use and outpatient follow-up for most likely cardiac MRI scan as well as fitting for outpatient monitoring.  RVP results still pending at this point in time and placed on doxycycline for sinusitis on outpatient setting which was continued while in observation.  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  Patient advised to discuss with her employer concerns over potential mold that she is focalized to myself as well as outpatient follow-up with her primary medical clinician.

## 2025-01-22 NOTE — ED ADULT NURSE REASSESSMENT NOTE - NS ED NURSE REASSESS COMMENT FT1
Report received from offgoing RN, charting as noted. Patient is A&Ox4, c/o facial pain related to sinus infection, requesting previously prescribed antibiotics and longer acting pain medication. patient is sinus vladimir on monitor, respirations are unlabored.

## 2025-01-22 NOTE — ED CDU PROVIDER DISPOSITION NOTE - NSFOLLOWUPINSTRUCTIONS_ED_ALL_ED_FT
please take Colchicine as directed   ibuprofen 600mg with food every 6 hours   please follow with cardiology outpatient for outpatient cardiac monitor as well as MRI  new or worsening condition seek re-evaluation     Chest Pain    Chest pain can be caused by many different conditions which may or may not be dangerous. Causes include heartburn, lung infections, heart attack, blood clot in lungs, skin infections, strain or damage to muscle, cartilage, or bones, etc. In addition to a history and physical examination, an electrocardiogram (ECG) or other lab tests may have been performed to determine the cause of your chest pain. Follow up with your primary care provider or with a cardiologist as instructed.     SEEK IMMEDIATE MEDICAL CARE IF YOU HAVE ANY OF THE FOLLOWING SYMPTOMS: worsening chest pain, coughing up blood, unexplained back/neck/jaw pain, severe abdominal pain, dizziness or lightheadedness, fainting, shortness of breath, sweaty or clammy skin, vomiting, or racing heart beat. These symptoms may represent a serious problem that is an emergency. Do not wait to see if the symptoms will go away. Get medical help right away. Call 911 and do not drive yourself to the hospital.

## 2025-01-22 NOTE — ED ADULT NURSE REASSESSMENT NOTE - NS ED NURSE REASSESS COMMENT FT1
pt resting comfortably in stretcher, provided meal tray. educated on plan of care. patient expressed understanding.

## 2025-01-22 NOTE — ED CDU PROVIDER DISPOSITION NOTE - CARE PROVIDERS DIRECT ADDRESSES
[email or web address]

## 2025-01-22 NOTE — ED CDU PROVIDER DISPOSITION NOTE - PROVIDER TOKENS
PROVIDER:[TOKEN:[[id number]],FOLLOWUP:[Urgent]] PROVIDER:[TOKEN:[[id number]],FOLLOWUP:[Urgent]],PROVIDER:[TOKEN:[[id number]],FOLLOWUP:[Routine]]

## 2025-01-22 NOTE — ED CDU PROVIDER DISPOSITION NOTE - CARE PROVIDER_API CALL
Edita Yancey  Cardiology  [address] [place] [id number]  Phone: [phone]  Fax: [phone]  Follow Up Time: Urgent   Edita Yancey  Cardiology  [address] [place] [address]  Phone: [phone]  Fax: [phone]  Follow Up Time: Urgent    Brigido Maldonado  Pulmonary Disease  [address] [facility], [address]  [place] [address]  Phone: [phone]  Fax: [phone]  Follow Up Time: Routine

## 2025-01-22 NOTE — ED CDU PROVIDER DISPOSITION NOTE - PATIENT PORTAL LINK FT
You can access the FollowMyHealth Patient Portal offered by [facility] by registering at the following website: [email or web address]. By joining [employer]’s FollowMyHealth portal, you will also be able to view your health information using other applications (apps) compatible with our system. You can access the FollowMyHealth Patient Portal offered by [facility] by registering at the following website: [email or web address]. By joining [employer]’s FollowMyHealth portal, you will also be able to view your health information using other applications (apps) compatible with our system.

## 2025-01-22 NOTE — ED CDU PROVIDER SUBSEQUENT DAY NOTE - PHYSICAL EXAMINATION
EOMI, no scleral icterus  Cardiac: bradycardic but regular, no apparent murmurs, no lower extremity edema  Respiratory: CTABL, no respiratory distress   Abdomen: soft, ND, NT, nonperitonitic  MSK/Vascular: full ROM, soft compartments, warm extremities, 2+ peripheral pulses b/l  Neuro: AAOx3, sensation to light touch intact  Psych: calm, cooperative

## 2025-01-22 NOTE — ED CDU PROVIDER DISPOSITION NOTE - CLINICAL COURSE
39-year-old female who presented to the emergency department left-sided chest discomfort worse with inspiration relieved by sitting up and leaning forward, reported similar to her past experiences with pericarditis I been placed into observation following initial ED evaluation and cardiology consultation ordered for echocardiogram and CT CTA of which were completed while in observation.  Of note was found to be slightly bradycardic and hypotensive following cardiac imaging with improvement of blood pressure status post fluid bolus and heart rate stable within the mid 40s to 50s.  CTCA reviewed by cardiology as well as echocardiogram without plans for further intervention while in the emergency department, requesting for reinitiation of colchicine for the next 3 months as well as NSAID use and outpatient follow-up for most likely cardiac MRI scan as well as fitting for outpatient monitoring.  RVP results still pending at this point in time and placed on doxycycline for sinusitis on outpatient setting which was continued while in observation.  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  Patient advised to discuss with her employer concerns over potential mold that she is focalized to myself as well as outpatient follow-up with her primary medical clinician.  RSV positive results prior to discharge advised on conservative management of symptoms and fu instructions 39-year-old female who presented to the emergency department left-sided chest discomfort worse with inspiration relieved by sitting up and leaning forward, reported similar to her past experiences with pericarditis I been placed into observation following initial ED evaluation and cardiology consultation ordered for echocardiogram and CT CTA of which were completed while in observation.  Of note was found to be slightly bradycardic and hypotensive following cardiac imaging with improvement of blood pressure status post fluid bolus and heart rate stable within the mid 40s to 50s.  CTCA reviewed by cardiology as well as echocardiogram without plans for further intervention while in the emergency department, requesting for reinitiation of colchicine for the next 3 months as well as NSAID use and outpatient follow-up for most likely cardiac MRI scan as well as fitting for outpatient monitoring.  RVP results still pending at this point in time and placed on doxycycline for sinusitis on outpatient setting which was continued while in observation.  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  Patient advised to discuss with her employer concerns over potential mold that she is focalized to myself as well as outpatient follow-up with her primary medical clinician.  RSV positive results prior to discharge advised on conservative management of symptoms and fu instructions  upon discharge pt requesting to stay to continue monitoring due to continued pain, ordered for toradol, oxy and more ivf

## 2025-02-04 ENCOUNTER — APPOINTMENT (OUTPATIENT)
Dept: CARDIOLOGY | Facility: CLINIC | Age: 40
End: 2025-02-04
Payer: COMMERCIAL

## 2025-02-04 ENCOUNTER — NON-APPOINTMENT (OUTPATIENT)
Age: 40
End: 2025-02-04

## 2025-02-04 VITALS — HEART RATE: 58 BPM | SYSTOLIC BLOOD PRESSURE: 82 MMHG | DIASTOLIC BLOOD PRESSURE: 80 MMHG | OXYGEN SATURATION: 100 %

## 2025-02-04 VITALS
WEIGHT: 174 LBS | OXYGEN SATURATION: 100 % | SYSTOLIC BLOOD PRESSURE: 110 MMHG | HEIGHT: 68 IN | HEART RATE: 57 BPM | BODY MASS INDEX: 26.37 KG/M2 | DIASTOLIC BLOOD PRESSURE: 70 MMHG

## 2025-02-04 VITALS — OXYGEN SATURATION: 100 % | HEART RATE: 60 BPM | DIASTOLIC BLOOD PRESSURE: 60 MMHG | SYSTOLIC BLOOD PRESSURE: 92 MMHG

## 2025-02-04 DIAGNOSIS — R00.1 BRADYCARDIA, UNSPECIFIED: ICD-10-CM

## 2025-02-04 DIAGNOSIS — R42 DIZZINESS AND GIDDINESS: ICD-10-CM

## 2025-02-04 DIAGNOSIS — R06.83 SNORING: ICD-10-CM

## 2025-02-04 DIAGNOSIS — R06.02 SHORTNESS OF BREATH: ICD-10-CM

## 2025-02-04 DIAGNOSIS — I30.0 ACUTE NONSPECIFIC IDIOPATHIC PERICARDITIS: ICD-10-CM

## 2025-02-04 DIAGNOSIS — R07.9 CHEST PAIN, UNSPECIFIED: ICD-10-CM

## 2025-02-04 DIAGNOSIS — Z09 ENCOUNTER FOR FOLLOW-UP EXAMINATION AFTER COMPLETED TREATMENT FOR CONDITIONS OTHER THAN MALIGNANT NEOPLASM: ICD-10-CM

## 2025-02-04 DIAGNOSIS — R06.81 APNEA, NOT ELSEWHERE CLASSIFIED: ICD-10-CM

## 2025-02-04 DIAGNOSIS — R07.89 OTHER CHEST PAIN: ICD-10-CM

## 2025-02-04 PROCEDURE — 93000 ELECTROCARDIOGRAM COMPLETE: CPT | Mod: 59

## 2025-02-04 PROCEDURE — 93242 EXT ECG>48HR<7D RECORDING: CPT

## 2025-02-04 PROCEDURE — 99215 OFFICE O/P EST HI 40 MIN: CPT

## 2025-02-04 RX ORDER — COLCHICINE 0.6 MG/1
0.6 TABLET ORAL TWICE DAILY
Refills: 0 | Status: ACTIVE | COMMUNITY

## 2025-02-04 RX ORDER — LAMOTRIGINE 100 MG/1
100 TABLET ORAL TWICE DAILY
Refills: 0 | Status: ACTIVE | COMMUNITY

## 2025-02-12 ENCOUNTER — OUTPATIENT (OUTPATIENT)
Dept: OUTPATIENT SERVICES | Facility: HOSPITAL | Age: 40
LOS: 1 days | End: 2025-02-12

## 2025-02-12 DIAGNOSIS — G47.33 OBSTRUCTIVE SLEEP APNEA (ADULT) (PEDIATRIC): ICD-10-CM

## 2025-02-12 PROCEDURE — 95800 SLP STDY UNATTENDED: CPT

## 2025-02-12 PROCEDURE — 95800 SLP STDY UNATTENDED: CPT | Mod: 26

## 2025-04-15 ENCOUNTER — APPOINTMENT (OUTPATIENT)
Dept: CARDIOLOGY | Facility: CLINIC | Age: 40
End: 2025-04-15

## 2025-05-28 ENCOUNTER — APPOINTMENT (OUTPATIENT)
Dept: ORTHOPEDIC SURGERY | Facility: CLINIC | Age: 40
End: 2025-05-28